# Patient Record
Sex: MALE | Race: WHITE | NOT HISPANIC OR LATINO | Employment: FULL TIME | URBAN - NONMETROPOLITAN AREA
[De-identification: names, ages, dates, MRNs, and addresses within clinical notes are randomized per-mention and may not be internally consistent; named-entity substitution may affect disease eponyms.]

---

## 2017-01-20 ENCOUNTER — APPOINTMENT (OUTPATIENT)
Dept: GENERAL RADIOLOGY | Facility: HOSPITAL | Age: 62
End: 2017-01-20

## 2017-01-20 ENCOUNTER — HOSPITAL ENCOUNTER (INPATIENT)
Facility: HOSPITAL | Age: 62
LOS: 4 days | Discharge: HOME | End: 2017-01-24
Attending: EMERGENCY MEDICINE | Admitting: INTERNAL MEDICINE

## 2017-01-20 DIAGNOSIS — I21.4 NON-STEMI (NON-ST ELEVATED MYOCARDIAL INFARCTION) (HCC): Primary | ICD-10-CM

## 2017-01-20 PROBLEM — R07.9 CHEST PAIN OF UNKNOWN ETIOLOGY: Status: ACTIVE | Noted: 2017-01-20

## 2017-01-20 LAB
ALBUMIN SERPL-MCNC: 4 G/DL (ref 3.4–4.8)
ALBUMIN/GLOB SERPL: 1.2 G/DL (ref 1.5–2.5)
ALP SERPL-CCNC: 64 U/L (ref 46–116)
ALT SERPL W P-5'-P-CCNC: 27 U/L (ref 10–44)
ANION GAP SERPL CALCULATED.3IONS-SCNC: 6.8 MMOL/L (ref 3.6–11.2)
APTT PPP: 24.4 SECONDS (ref 24.4–31)
APTT PPP: 25 SECONDS (ref 24.4–31)
AST SERPL-CCNC: 61 U/L (ref 10–34)
BASOPHILS # BLD AUTO: 0.02 10*3/MM3 (ref 0–0.3)
BASOPHILS NFR BLD AUTO: 0.3 % (ref 0–2)
BILIRUB SERPL-MCNC: 0.4 MG/DL (ref 0.2–1.8)
BILIRUB UR QL STRIP: NEGATIVE
BNP SERPL-MCNC: 157 PG/ML (ref 0–100)
BUN BLD-MCNC: 12 MG/DL (ref 7–21)
BUN/CREAT SERPL: 12.4 (ref 7–25)
CALCIUM SPEC-SCNC: 9.3 MG/DL (ref 7.7–10)
CHLORIDE SERPL-SCNC: 108 MMOL/L (ref 99–112)
CHOLEST SERPL-MCNC: 198 MG/DL (ref 0–200)
CK MB SERPL-CCNC: 61.03 NG/ML (ref 0–5)
CK MB SERPL-RTO: 9.2 % (ref 0–3)
CK SERPL-CCNC: 665 U/L (ref 24–204)
CLARITY UR: CLEAR
CO2 SERPL-SCNC: 30.2 MMOL/L (ref 24.3–31.9)
COLOR UR: YELLOW
CREAT BLD-MCNC: 0.97 MG/DL (ref 0.43–1.29)
DEPRECATED RDW RBC AUTO: 48.7 FL (ref 37–54)
DEPRECATED RDW RBC AUTO: 48.9 FL (ref 37–54)
EOSINOPHIL # BLD AUTO: 0.34 10*3/MM3 (ref 0–0.7)
EOSINOPHIL NFR BLD AUTO: 5.5 % (ref 0–5)
ERYTHROCYTE [DISTWIDTH] IN BLOOD BY AUTOMATED COUNT: 14.1 % (ref 11.5–14.5)
ERYTHROCYTE [DISTWIDTH] IN BLOOD BY AUTOMATED COUNT: 14.2 % (ref 11.5–14.5)
GFR SERPL CREATININE-BSD FRML MDRD: 78 ML/MIN/1.73
GLOBULIN UR ELPH-MCNC: 3.3 GM/DL
GLUCOSE BLD-MCNC: 87 MG/DL (ref 70–110)
GLUCOSE UR STRIP-MCNC: NEGATIVE MG/DL
HCT VFR BLD AUTO: 43.4 % (ref 42–52)
HCT VFR BLD AUTO: 44 % (ref 42–52)
HDLC SERPL-MCNC: 35 MG/DL (ref 60–100)
HGB BLD-MCNC: 14 G/DL (ref 14–18)
HGB BLD-MCNC: 14.1 G/DL (ref 14–18)
HGB UR QL STRIP.AUTO: NEGATIVE
IMM GRANULOCYTES # BLD: 0.01 10*3/MM3 (ref 0–0.03)
IMM GRANULOCYTES NFR BLD: 0.2 % (ref 0–0.5)
INR PPP: 0.93 (ref 0.8–1.1)
INR PPP: 0.94 (ref 0.8–1.1)
KETONES UR QL STRIP: NEGATIVE
LDLC SERPL CALC-MCNC: 123 MG/DL (ref 0–100)
LDLC/HDLC SERPL: 3.5 {RATIO}
LEUKOCYTE ESTERASE UR QL STRIP.AUTO: NEGATIVE
LIPASE SERPL-CCNC: 39 U/L (ref 13–60)
LYMPHOCYTES # BLD AUTO: 2.15 10*3/MM3 (ref 1–3)
LYMPHOCYTES NFR BLD AUTO: 34.6 % (ref 21–51)
MCH RBC QN AUTO: 31.1 PG (ref 27–33)
MCH RBC QN AUTO: 31.8 PG (ref 27–33)
MCHC RBC AUTO-ENTMCNC: 31.8 G/DL (ref 33–37)
MCHC RBC AUTO-ENTMCNC: 32.5 G/DL (ref 33–37)
MCV RBC AUTO: 97.7 FL (ref 80–94)
MCV RBC AUTO: 97.8 FL (ref 80–94)
MONOCYTES # BLD AUTO: 0.61 10*3/MM3 (ref 0.1–0.9)
MONOCYTES NFR BLD AUTO: 9.8 % (ref 0–10)
NEUTROPHILS # BLD AUTO: 3.08 10*3/MM3 (ref 1.4–6.5)
NEUTROPHILS NFR BLD AUTO: 49.6 % (ref 30–70)
NITRITE UR QL STRIP: NEGATIVE
OSMOLALITY SERPL CALC.SUM OF ELEC: 287.8 MOSM/KG (ref 273–305)
PH UR STRIP.AUTO: 5.5 [PH] (ref 5–8)
PLATELET # BLD AUTO: 184 10*3/MM3 (ref 130–400)
PLATELET # BLD AUTO: 185 10*3/MM3 (ref 130–400)
PMV BLD AUTO: 10.6 FL (ref 6–10)
PMV BLD AUTO: 10.9 FL (ref 6–10)
POTASSIUM BLD-SCNC: 4 MMOL/L (ref 3.5–5.3)
PROT SERPL-MCNC: 7.3 G/DL (ref 6–8)
PROT UR QL STRIP: NEGATIVE
PROTHROMBIN TIME: 10.5 SECONDS (ref 9.8–11.9)
PROTHROMBIN TIME: 10.6 SECONDS (ref 9.8–11.9)
RBC # BLD AUTO: 4.44 10*6/MM3 (ref 4.7–6.1)
RBC # BLD AUTO: 4.5 10*6/MM3 (ref 4.7–6.1)
SODIUM BLD-SCNC: 145 MMOL/L (ref 135–153)
SP GR UR STRIP: >1.03 (ref 1–1.03)
TRIGL SERPL-MCNC: 202 MG/DL (ref 0–150)
TROPONIN I SERPL-MCNC: 5.64 NG/ML
TROPONIN I SERPL-MCNC: 7.73 NG/ML
UROBILINOGEN UR QL STRIP: ABNORMAL
VLDLC SERPL-MCNC: 40.4 MG/DL
WBC NRBC COR # BLD: 6.21 10*3/MM3 (ref 4.5–12.5)
WBC NRBC COR # BLD: 8.08 10*3/MM3 (ref 4.5–12.5)

## 2017-01-20 PROCEDURE — 25010000002 EPTIFIBATIDE 20 MG/10ML SOLUTION: Performed by: INTERNAL MEDICINE

## 2017-01-20 PROCEDURE — 85730 THROMBOPLASTIN TIME PARTIAL: CPT | Performed by: INTERNAL MEDICINE

## 2017-01-20 PROCEDURE — 99223 1ST HOSP IP/OBS HIGH 75: CPT | Performed by: INTERNAL MEDICINE

## 2017-01-20 PROCEDURE — 99285 EMERGENCY DEPT VISIT HI MDM: CPT

## 2017-01-20 PROCEDURE — 85730 THROMBOPLASTIN TIME PARTIAL: CPT | Performed by: EMERGENCY MEDICINE

## 2017-01-20 PROCEDURE — 84484 ASSAY OF TROPONIN QUANT: CPT | Performed by: INTERNAL MEDICINE

## 2017-01-20 PROCEDURE — 93005 ELECTROCARDIOGRAM TRACING: CPT | Performed by: EMERGENCY MEDICINE

## 2017-01-20 PROCEDURE — 71010 XR CHEST 1 VW: CPT | Performed by: RADIOLOGY

## 2017-01-20 PROCEDURE — 85025 COMPLETE CBC W/AUTO DIFF WBC: CPT | Performed by: EMERGENCY MEDICINE

## 2017-01-20 PROCEDURE — 80061 LIPID PANEL: CPT | Performed by: EMERGENCY MEDICINE

## 2017-01-20 PROCEDURE — 25010000002 EPTIFIBATIDE PER 5 MG: Performed by: INTERNAL MEDICINE

## 2017-01-20 PROCEDURE — 82550 ASSAY OF CK (CPK): CPT | Performed by: INTERNAL MEDICINE

## 2017-01-20 PROCEDURE — 83880 ASSAY OF NATRIURETIC PEPTIDE: CPT | Performed by: EMERGENCY MEDICINE

## 2017-01-20 PROCEDURE — 36415 COLL VENOUS BLD VENIPUNCTURE: CPT

## 2017-01-20 PROCEDURE — 85027 COMPLETE CBC AUTOMATED: CPT | Performed by: INTERNAL MEDICINE

## 2017-01-20 PROCEDURE — 80053 COMPREHEN METABOLIC PANEL: CPT | Performed by: EMERGENCY MEDICINE

## 2017-01-20 PROCEDURE — 25010000002 HEPARIN (PORCINE) PER 1000 UNITS: Performed by: INTERNAL MEDICINE

## 2017-01-20 PROCEDURE — 99284 EMERGENCY DEPT VISIT MOD MDM: CPT

## 2017-01-20 PROCEDURE — 81003 URINALYSIS AUTO W/O SCOPE: CPT | Performed by: EMERGENCY MEDICINE

## 2017-01-20 PROCEDURE — 84484 ASSAY OF TROPONIN QUANT: CPT | Performed by: EMERGENCY MEDICINE

## 2017-01-20 PROCEDURE — 82553 CREATINE MB FRACTION: CPT | Performed by: INTERNAL MEDICINE

## 2017-01-20 PROCEDURE — 71010 HC CHEST PA OR AP: CPT

## 2017-01-20 PROCEDURE — 93010 ELECTROCARDIOGRAM REPORT: CPT | Performed by: INTERNAL MEDICINE

## 2017-01-20 PROCEDURE — 85610 PROTHROMBIN TIME: CPT | Performed by: EMERGENCY MEDICINE

## 2017-01-20 PROCEDURE — 83690 ASSAY OF LIPASE: CPT | Performed by: EMERGENCY MEDICINE

## 2017-01-20 PROCEDURE — 85610 PROTHROMBIN TIME: CPT | Performed by: INTERNAL MEDICINE

## 2017-01-20 RX ORDER — GABAPENTIN 300 MG/1
300 CAPSULE ORAL EVERY 12 HOURS SCHEDULED
Status: DISCONTINUED | OUTPATIENT
Start: 2017-01-20 | End: 2017-01-24 | Stop reason: HOSPADM

## 2017-01-20 RX ORDER — ASPIRIN 325 MG
325 TABLET, DELAYED RELEASE (ENTERIC COATED) ORAL DAILY
Status: DISCONTINUED | OUTPATIENT
Start: 2017-01-21 | End: 2017-01-24 | Stop reason: HOSPADM

## 2017-01-20 RX ORDER — HEPARIN SODIUM 5000 [USP'U]/ML
56.5 INJECTION, SOLUTION INTRAVENOUS; SUBCUTANEOUS ONCE
Status: COMPLETED | OUTPATIENT
Start: 2017-01-20 | End: 2017-01-20

## 2017-01-20 RX ORDER — ASPIRIN 325 MG
325 TABLET, DELAYED RELEASE (ENTERIC COATED) ORAL DAILY
COMMUNITY
End: 2017-02-22 | Stop reason: SDUPTHER

## 2017-01-20 RX ORDER — GABAPENTIN 300 MG/1
300 CAPSULE ORAL 2 TIMES DAILY
COMMUNITY

## 2017-01-20 RX ORDER — HEPARIN SODIUM 5000 [USP'U]/ML
56.5 INJECTION, SOLUTION INTRAVENOUS; SUBCUTANEOUS AS NEEDED
Status: DISCONTINUED | OUTPATIENT
Start: 2017-01-20 | End: 2017-01-23

## 2017-01-20 RX ORDER — MELOXICAM 15 MG/1
15 TABLET ORAL DAILY PRN
COMMUNITY
End: 2018-03-21

## 2017-01-20 RX ORDER — HEPARIN SODIUM 5000 [USP'U]/ML
INJECTION, SOLUTION INTRAVENOUS; SUBCUTANEOUS
Status: DISPENSED
Start: 2017-01-20 | End: 2017-01-21

## 2017-01-20 RX ORDER — EPTIFIBATIDE 0.75 MG/ML
2 INJECTION, SOLUTION INTRAVENOUS CONTINUOUS
Status: DISPENSED | OUTPATIENT
Start: 2017-01-20 | End: 2017-01-21

## 2017-01-20 RX ORDER — PANTOPRAZOLE SODIUM 40 MG/1
40 TABLET, DELAYED RELEASE ORAL DAILY
Status: DISCONTINUED | OUTPATIENT
Start: 2017-01-21 | End: 2017-01-24 | Stop reason: HOSPADM

## 2017-01-20 RX ORDER — MELOXICAM 7.5 MG/1
15 TABLET ORAL DAILY PRN
Status: DISCONTINUED | OUTPATIENT
Start: 2017-01-20 | End: 2017-01-20

## 2017-01-20 RX ORDER — CLOPIDOGREL BISULFATE 75 MG/1
TABLET ORAL
Status: COMPLETED
Start: 2017-01-20 | End: 2017-01-20

## 2017-01-20 RX ORDER — CLOPIDOGREL BISULFATE 75 MG/1
600 TABLET ORAL ONCE
Status: COMPLETED | OUTPATIENT
Start: 2017-01-20 | End: 2017-01-20

## 2017-01-20 RX ORDER — HEPARIN SODIUM 5000 [USP'U]/ML
28.2 INJECTION, SOLUTION INTRAVENOUS; SUBCUTANEOUS AS NEEDED
Status: DISCONTINUED | OUTPATIENT
Start: 2017-01-20 | End: 2017-01-23

## 2017-01-20 RX ORDER — ASPIRIN 81 MG/1
TABLET, CHEWABLE ORAL
Status: COMPLETED
Start: 2017-01-20 | End: 2017-01-20

## 2017-01-20 RX ORDER — CARVEDILOL 6.25 MG/1
12.5 TABLET ORAL 2 TIMES DAILY WITH MEALS
Status: DISCONTINUED | OUTPATIENT
Start: 2017-01-20 | End: 2017-01-23

## 2017-01-20 RX ORDER — ASPIRIN 81 MG/1
324 TABLET, CHEWABLE ORAL ONCE
Status: COMPLETED | OUTPATIENT
Start: 2017-01-20 | End: 2017-01-20

## 2017-01-20 RX ORDER — EPTIFIBATIDE 20 MG/10ML
180 INJECTION INTRAVENOUS ONCE
Status: COMPLETED | OUTPATIENT
Start: 2017-01-20 | End: 2017-01-20

## 2017-01-20 RX ORDER — CETIRIZINE HYDROCHLORIDE 10 MG/1
10 TABLET ORAL DAILY
Status: DISCONTINUED | OUTPATIENT
Start: 2017-01-21 | End: 2017-01-24 | Stop reason: HOSPADM

## 2017-01-20 RX ORDER — PANTOPRAZOLE SODIUM 40 MG/1
40 TABLET, DELAYED RELEASE ORAL DAILY
COMMUNITY

## 2017-01-20 RX ORDER — CARVEDILOL 12.5 MG/1
12.5 TABLET ORAL 2 TIMES DAILY WITH MEALS
COMMUNITY
End: 2017-01-24 | Stop reason: HOSPADM

## 2017-01-20 RX ORDER — CETIRIZINE HYDROCHLORIDE 10 MG/1
10 TABLET ORAL DAILY
COMMUNITY

## 2017-01-20 RX ADMIN — EPTIFIBATIDE 15940 MCG: 2 INJECTION, SOLUTION INTRAVENOUS at 22:32

## 2017-01-20 RX ADMIN — CLOPIDOGREL BISULFATE 600 MG: 75 TABLET ORAL at 20:08

## 2017-01-20 RX ADMIN — EPTIFIBATIDE 2 MCG/KG/MIN: 0.75 INJECTION, SOLUTION INTRAVENOUS at 22:15

## 2017-01-20 RX ADMIN — ASPIRIN 324 MG: 81 TABLET, CHEWABLE ORAL at 20:08

## 2017-01-20 RX ADMIN — GABAPENTIN 300 MG: 300 CAPSULE ORAL at 22:14

## 2017-01-20 RX ADMIN — HEPARIN SODIUM 1000 UNITS/HR: 10000 INJECTION, SOLUTION INTRAVENOUS at 20:13

## 2017-01-20 RX ADMIN — CLOPIDOGREL BISULFATE 600 MG: 75 TABLET, FILM COATED ORAL at 20:08

## 2017-01-20 RX ADMIN — HEPARIN SODIUM 5000 UNITS: 5000 INJECTION, SOLUTION INTRAVENOUS; SUBCUTANEOUS at 20:12

## 2017-01-20 NOTE — Clinical Note
50 ml of contrast added to back table for intervention  500 ml of heparin flush 2 units/ml added to back table for intervention  30 ml of nitroglycerin added to back table 100 mcg/ml

## 2017-01-20 NOTE — ED PROVIDER NOTES
Subjective   Patient is a 61 y.o. male presenting with chest pain.   History provided by:  Patient  Chest Pain   Pain location:  Substernal area  Pain quality: tightness    Pain radiates to:  L shoulder and R shoulder  Pain severity:  Moderate  Onset quality:  Sudden  Timing:  Intermittent  Progression:  Resolved  Chronicity:  New  Relieved by:  Rest  Worsened by:  Exertion  Associated symptoms: abdominal pain    Associated symptoms: no fever, no nausea, no shortness of breath and no vomiting        Review of Systems   Constitutional: Negative for activity change and fever.   HENT: Negative.    Eyes: Negative for discharge.   Respiratory: Negative for shortness of breath.    Cardiovascular: Positive for chest pain.   Gastrointestinal: Positive for abdominal pain. Negative for diarrhea, nausea and vomiting.   Genitourinary: Negative for difficulty urinating and flank pain.   Musculoskeletal: Negative for arthralgias and myalgias.   Skin: Negative for rash and wound.   Psychiatric/Behavioral: Negative for agitation.   All other systems reviewed and are negative.      History reviewed. No pertinent past medical history.    No Known Allergies    History reviewed. No pertinent past surgical history.    No family history on file.    Social History     Social History   • Marital status:      Spouse name: N/A   • Number of children: N/A   • Years of education: N/A     Social History Main Topics   • Smoking status: None   • Smokeless tobacco: None   • Alcohol use None   • Drug use: None   • Sexual activity: Not Asked     Other Topics Concern   • None     Social History Narrative   • None           Objective   Physical Exam   Constitutional: He is oriented to person, place, and time. He appears well-developed and well-nourished.   HENT:   Head: Normocephalic.   Right Ear: External ear normal.   Left Ear: External ear normal.   Nose: Nose normal.   Mouth/Throat: Oropharynx is clear and moist.   Eyes: EOM are normal.  Pupils are equal, round, and reactive to light.   Neck: Normal range of motion. Neck supple. No thyromegaly present.   Cardiovascular: Normal rate, regular rhythm, normal heart sounds and intact distal pulses.    Pulmonary/Chest: Effort normal and breath sounds normal. No respiratory distress. He has no wheezes. He has no rales.   Abdominal: Soft. He exhibits no distension. There is no tenderness. There is no rebound and no guarding.   Musculoskeletal: Normal range of motion. He exhibits no edema or tenderness.   Neurological: He is alert and oriented to person, place, and time. He has normal reflexes. No cranial nerve deficit.   Skin: Skin is warm and dry. No rash noted.   Psychiatric: He has a normal mood and affect. His behavior is normal. Judgment and thought content normal.   Nursing note and vitals reviewed.      Procedures         ED Course  ED Course   Value Comment By Time   SCANNED EKG (Reviewed) Brett Santos MD 01/20 1813   SCANNED EKG (Reviewed) Brett Santos MD 01/20 1813    Admit patient to Dr. Rebecca Santos MD 01/20 1911    Patient's troponin came back elevated at 5.6.  Dr. Fernandez was notified we started the patient on heparin drip and Plavix.  We'll send the patient to the unit Brett Santos MD 01/20 1958                  MDM  Number of Diagnoses or Management Options  Non-STEMI (non-ST elevated myocardial infarction): new and requires workup     Amount and/or Complexity of Data Reviewed  Clinical lab tests: reviewed and ordered  Tests in the radiology section of CPT®: ordered and reviewed  Tests in the medicine section of CPT®: ordered and reviewed  Discuss the patient with other providers: yes    Risk of Complications, Morbidity, and/or Mortality  Presenting problems: moderate  Diagnostic procedures: moderate  Management options: moderate    Patient Progress  Patient progress: stable      Final diagnoses:   Non-STEMI (non-ST elevated myocardial infarction)            Brett  MD Tom  01/20/17 1929       Brett Santos MD  01/20/17 2000

## 2017-01-20 NOTE — IP AVS SNAPSHOT
AFTER VISIT SUMMARY             Keith Craig           About your hospitalization     You were admitted on:  January 20, 2017 You last received care in the:  [facility]       Procedures & Surgeries      Procedure(s) (LRB):  Left Heart Cath (N/A)  Percutaneous Coronary Intervention (N/A)     1/20/2017 - 1/23/2017     Surgeon(s):  Larry Fernandez MD  -------------------      Medications    If you or your caregiver advised us that you are currently taking a medication and that medication is marked below as “Resume”, this simply indicates that we have reviewed those medications to make sure our new therapy recommendations do not interfere.  If you have concerns about medications other than those new ones which we are prescribing today, please consult the physician who prescribed them (or your primary physician).  Our review of your home medications is not meant to indicate that we are directing their use.             Your Medications      START taking these medications     atorvastatin 40 MG tablet   Take 1 tablet by mouth Daily.   Last time this was given:  1/24/2017  8:22 AM   Commonly known as:  LIPITOR           clopidogrel 75 MG tablet   Take 1 tablet by mouth Daily.   Last time this was given:  1/24/2017  8:21 AM   Commonly known as:  PLAVIX           clopidogrel 75 MG tablet   Take 1 tablet by mouth Daily.   Last time this was given:  1/24/2017  8:21 AM   Commonly known as:  PLAVIX           lisinopril 2.5 MG tablet   Take 1 tablet by mouth Daily.   Commonly known as:  PRINIVIL,ZESTRIL           lisinopril 2.5 MG tablet   Take 1 tablet by mouth Daily.   Commonly known as:  PRINIVIL,ZESTRIL             CHANGE how you take these medications     aspirin  MG tablet   Take 1 tablet by mouth Daily.   Last time this was given:  1/24/2017  8:22 AM   What changed:  You were already taking a medication with the same name, and this prescription was added. Make sure you understand how  and when to take each.           aspirin  MG tablet   Take 325 mg by mouth Daily.   Last time this was given:  1/24/2017  8:22 AM   What changed:  Another medication with the same name was added. Make sure you understand how and when to take each.           carvedilol 3.125 MG tablet   Take 1 tablet by mouth 2 (Two) Times a Day With Meals.   Last time this was given:  1/24/2017  8:21 AM   Commonly known as:  COREG   What changed:    - medication strength  - how much to take           carvedilol 3.125 MG tablet   Take 1 tablet by mouth 2 Times a Day.   Last time this was given:  1/24/2017  8:21 AM   Commonly known as:  COREG   What changed:  You were already taking a medication with the same name, and this prescription was added. Make sure you understand how and when to take each.             CONTINUE taking these medications     cetirizine 10 MG tablet   Take 10 mg by mouth Daily.   Last time this was given:  1/24/2017  8:21 AM   Commonly known as:  zyrTEC           gabapentin 300 MG capsule   Take 300 mg by mouth 2 (Two) Times a Day.   Last time this was given:  1/24/2017  8:21 AM   Commonly known as:  NEURONTIN           meloxicam 15 MG tablet   Take 15 mg by mouth Daily As Needed.   Commonly known as:  MOBIC           pantoprazole 40 MG EC tablet   Take 40 mg by mouth Daily.   Last time this was given:  1/24/2017  8:21 AM   Commonly known as:  PROTONIX                Where to Get Your Medications      These medications were sent to [facility]  [address], [place] [address]    Hours:  8AM-6PM Mon-Fri Phone:  [phone]     aspirin  MG tablet    atorvastatin 40 MG tablet    carvedilol 3.125 MG tablet    clopidogrel 75 MG tablet    lisinopril 2.5 MG tablet         Information about where to get these medications is not yet available     ! Ask your nurse or doctor about these medications     carvedilol 3.125 MG tablet    clopidogrel 75 MG tablet    lisinopril 2.5 MG tablet                   Your Medications      Your Medication List           Morning Noon Evening Bedtime As Needed    * aspirin  MG tablet   Take 1 tablet by mouth Daily.                                * aspirin  MG tablet   Take 325 mg by mouth Daily.                                atorvastatin 40 MG tablet   Take 1 tablet by mouth Daily.   Commonly known as:  LIPITOR                                * carvedilol 3.125 MG tablet   Take 1 tablet by mouth 2 (Two) Times a Day With Meals.   Commonly known as:  COREG                                * carvedilol 3.125 MG tablet   Take 1 tablet by mouth 2 Times a Day.   Commonly known as:  COREG                                cetirizine 10 MG tablet   Take 10 mg by mouth Daily.   Commonly known as:  zyrTEC                                * clopidogrel 75 MG tablet   Take 1 tablet by mouth Daily.   Commonly known as:  PLAVIX                                * clopidogrel 75 MG tablet   Take 1 tablet by mouth Daily.   Commonly known as:  PLAVIX                                gabapentin 300 MG capsule   Take 300 mg by mouth 2 (Two) Times a Day.   Commonly known as:  NEURONTIN                                * lisinopril 2.5 MG tablet   Take 1 tablet by mouth Daily.   Commonly known as:  PRINIVIL,ZESTRIL                                * lisinopril 2.5 MG tablet   Take 1 tablet by mouth Daily.   Commonly known as:  PRINIVIL,ZESTRIL                                meloxicam 15 MG tablet   Take 15 mg by mouth Daily As Needed.   Commonly known as:  MOBIC                                pantoprazole 40 MG EC tablet   Take 40 mg by mouth Daily.   Commonly known as:  PROTONIX                                * Notice:  This list has 8 medication(s) that are the same as other medications prescribed for you. Read the directions carefully, and ask your doctor or other care provider to review them with you.             Instructions for After Discharge        Discharge References/Attachments      CARDIAC REHABILITATION (ENGLISH)    CORONARY ANGIOGRAM WITH STENT, CARE AFTER  (ENGLISH)       Follow-ups for After Discharge        Scheduled Appointments     Feb 08, 2017  9:00 AM EST   Follow Up with Larry Fernandez MD   [facility] (--)    [address]  [place] [address] [phone]           Arrive 15 minutes prior to appointment.            Feb 16, 2017  4:15 PM EST   New Patient with Mark Oneill MD   [facility] (--)    [address]  [place] [id number] [phone]           Bring all previous medical records and films, along with current medications and insurance information.            Additional instructions:      Dr. Larry Fernandez on Wednesday, February 8, 2017 @ 09:00 am  [address] [facility]  [address]  [place] [address]  [phone]              CommitChange Signup     Our records indicate that you have an active [employer] account.    You can view your After Visit Summary by going to [employer] and logging in with your CommitChange username and password.  If you don't have a CommitChange username and password but a parent or guardian has access to your record, the parent or guardian should login with their own CommitChange username and password and access your record to view the After Visit Summary.    If you have questions, you can email [email or web address] or call [phone] to talk to our CommitChange staff.  Remember, CommitChange is NOT to be used for urgent needs.  For medical emergencies, dial 911.           Summary of Your Hospitalization        Reason for Hospitalization     Your primary diagnosis was:  Not on File    Your diagnoses also included:  Chest Pain Of Unknown Etiology, Heart Attack      Care Providers     Provider Service Role Specialty    Larry Fernandez MD Cardiology Attending Provider Cardiology    Larry Fernandez MD Cardiology Consulting Physician  Cardiology      Your  "Allergies  Date Reviewed: 1/20/2017    No active allergies      Patient Belongings Returned     Document Return of Belongings Flowsheet     Were the patient bedside belongings sent home?   Yes   Belongings Retrieved from Security & Sent Home   N/A    Belongings Sent to Safe   --   Medications Retrieved from Pharmacy & Sent Home   N/A              More Information      Cardiac Rehabilitation  Cardiac rehabilitation is a medically supervised program that helps improve the health and well-being of people with heart problems. Cardiac rehabilitation includes exercise training, education, and counseling to help you get stronger and return to an active lifestyle. People who participate in cardiac rehabilitation programs get better faster and reduce future hospital stays.    Cardiac Rehab [facility]   Phone number [phone]  [address] [id number]    Cardiac rehabilitation programs can help when you have had the following conditions:  · Heart attack.  · Heart failure.  · Peripheral artery disease.  · Coronary artery disease.  · Angina.  · Lung or breathing problems.  Cardiac rehabilitation programs are also used when you have the following procedures:  · Coronary artery bypass graft surgery.  · Heart valve replacement.  · Heart stent placement.  · Heart transplant.  · Aneurysm repair.  CARDIAC REHABILITATION MAY HELP YOU:  · Reduce problems like chest pain and trouble breathing.  · Change risk factors that contribute to heart disease, such as:    Smoking.    High blood pressure.    High cholesterol.    Diabetes.    Being out of shape or not active.    Weighing more than 30% over your ideal weight.    Diet.  · Improve your mental outlook so you feel:    Less depressed or \"blue.\"    More hopeful.    Better about yourself.    More confident about taking care of yourself.  · Get support from health experts as well as other people with similar problems.  · Learn how to manage and understand your " medicines.  · Teach your family about your condition and how to participate in your recovery.  WHAT HAPPENS IN CARDIAC REHABILITATION?  You will be assessed by a cardiac rehabilitation team. They will check your health history and do a physical exam. You may need blood tests, stress tests, and other evaluations. You may not start a cardiac rehabilitation program if:  · You develop angina with exercise or while at rest.  · You have severe heart failure that limits your activity.  · You have an abnormal heart rhythm at rest.  · You develop heart rhythm problems during exercise.  · You have high blood pressure that is not controlled.  The cardiac rehabilitation team works with you to make a plan based on your health and goals. Everyone is unique, so each program is customized and your program may change as you progress. Members of a typical cardiac rehabilitation team may include such health professionals as:  · Doctors.  · Nurses.  · Dietitians.  · Psychologists.  · Exercise specialists.  · Physical and occupational therapists.  A typical cardiac rehabilitation program is divided into phases. You advance from one phase to the next. Most cardiac rehabilitation sessions last for 60 minutes, 3 times a week.   Phase One starts while you are still in the hospital. You may start by walking in your room and then in the robledo. You may start some simple exercises with a therapist. Health care team members will give you information and ask you many questions. You may not be able to remember details, so have a family member or an advocate with you to help keep track of information.   Phase Two begins when you go home or to another facility. This phase may last 8 to 12 weeks. You will travel to a cardiac rehabilitation center or a place where it is offered. Typically, you gradually increase your activity while being closely watched by a nurse or therapist. Exercises may be a combination of strength or resistance training and  "\"cardio\" or aerobic movement on a treadmill or other machines. Your condition will determine how often and how long these sessions will last.   In phase two, you may learn how to cook healthy meals, control your blood sugar, and manage your medicines. You may need help with scheduling or planning how and when to take your medicines. Use a timer, divided pill box, or follow a form to make taking your medicines easier. Use the method that works best for you. Some medicines should not be taken with certain foods. If you take more than one blood pressure medicine, you may need to stagger the times you take them. Taking all your blood pressure medicine at the same time may lower your blood pressure too much. If you have questions about your medicines, ask your health care provider questions until you understand.   Phase Three continues for the rest of your life. There will be less supervision. You may still participate in cardiac rehabilitation activities or become part of a group in your community. You may benefit from talking to other people about your experience if they are facing similar challenges.  How soon you drive, have sex, or return to work will depend on your condition. These decisions should be made by you and your health care provider. If you need help, ask for it. Find out where you can get the help you need. Ask questions until you get answers and understand.  SEEK IMMEDIATE MEDICAL CARE IF:   Get medical help at once if you experience any of the following symptoms:  · Severe chest discomfort, especially if the pain is crushing or pressure-like and spreads to the arms, back, neck, or jaw. Do not wait to see if the pain will go away.  · Weakness or numbness in your face, arms, or legs, especially on one side of the body; slurred speech; confusion; sudden severe headache or loss of vision (all symptoms of stroke).  · You have shortness of breath.  · You are sweating and feel sick to your stomach " (nausea).  · You feel dizzy or faint.  · You experience profound tiredness (fatigue).  Call your local emergency service (911 in the [place] Do not drive yourself to the hospital.     This information is not intended to replace advice given to you by your health care provider. Make sure you discuss any questions you have with your health care provider.     Document Released: 09/26/2009 Document Revised: 01/08/2016 Document Reviewed: 03/23/2012  [employer] Interactive Patient Education ©2016 [employer]          Coronary Angiogram With Stent, Care After  Refer to this sheet in the next few weeks. These instructions provide you with information about caring for yourself after your procedure. Your health care provider may also give you more specific instructions. Your treatment has been planned according to current medical practices, but problems sometimes occur. Call your health care provider if you have any problems or questions after your procedure.  WHAT TO EXPECT AFTER THE PROCEDURE   After your procedure, it is typical to have the following:  · Bruising at the catheter insertion site that usually fades within 1-2 weeks.  · Blood collecting in the tissue (hematoma) that may be painful to the touch. It should usually decrease in size and tenderness within 1-2 weeks.  HOME CARE INSTRUCTIONS  · Take medicines only as directed by your health care provider. Blood thinners may be prescribed after your procedure to improve blood flow through the stent.  · You may shower 24-48 hours after the procedure or as directed by your health care provider. Remove the bandage (dressing) and gently wash the catheter insertion site with plain soap and water. Pat the area dry with a clean towel. Do not rub the site, because this may cause bleeding.  · Do not take baths, swim, or use a hot tub until your health care provider approves.  · Check your catheter insertion site every day for redness, swelling, or drainage.  · Do not apply  powder or lotion to the site.  · Do not lift over 10 lb (4.5 kg) for 5 days after your procedure or as directed by your health care provider.  · Ask your health care provider when it is okay to:    Return to work or school.    Resume usual physical activities or sports.    Resume sexual activity.  · Eat a heart-healthy diet. This should include plenty of fresh fruits and vegetables. Meat should be lean cuts. Avoid the following types of food:    Food that is high in salt.    Canned or highly processed food.    Food that is high in saturated fat or sugar.    Fried food.  · Make any other lifestyle changes as recommended by your health care provider. These may include:    Not using any tobacco products, including cigarettes, chewing tobacco, or electronic cigarettes. If you need help quitting, ask your health care provider.    Managing your weight.    Getting regular exercise.    Managing your blood pressure.    Limiting your alcohol intake.    Managing other health problems, such as diabetes.  · If you need an MRI after your heart stent has been placed, be sure to tell the health care provider who orders the MRI that you have a heart stent.  · Keep all follow-up visits as directed by your health care provider. This is important.  SEEK MEDICAL CARE IF:  · You have a fever.  · You have chills.  · You have increased bleeding from the catheter insertion site. Hold pressure on the site.  SEEK IMMEDIATE MEDICAL CARE IF:  · You develop chest pain or shortness of breath, feel faint, or pass out.  · You have unusual pain at the catheter insertion site.  · You have redness, warmth, or swelling at the catheter insertion site.  · You have drainage (other than a small amount of blood on the dressing) from the catheter insertion site.  · The catheter insertion site is bleeding, and the bleeding does not stop after 30 minutes of holding steady pressure on the site.  · You develop bleeding from any other place, such as from your  rectum. There may be bright red blood in your urine or stool, or it may appear as black, tarry stool.     This information is not intended to replace advice given to you by your health care provider. Make sure you discuss any questions you have with your health care provider.     Document Released: 07/07/2006 Document Revised: 01/08/2016 Document Reviewed: 05/12/2014  [employer] Interactive Patient Education ©2016 Elsevier Inc.         PREVENTING SURGICAL SITE INFECTIONS     Surgical Site Infections FAQs  What is a Surgical Site Infection (SSI)?  A surgical site infection is an infection that occurs after surgery in the part of the body where the surgery took place. Most patients who have surgery do not develop an infection. However, infections develop in about 1 to 3 out of every 100 patients who have surgery.  Some of the common symptoms of a surgical site infection are:  · Redness and pain around the area where you had surgery  · Drainage of cloudy fluid from your surgical wound  · Fever  Can SSIs be treated?  Yes. Most surgical site infections can be treated with antibiotics. The antibiotic given to you depends on the bacteria (germs) causing the infection. Sometimes patients with SSIs also need another surgery to treat the infection.  What are some of the things that hospitals are doing to prevent SSIs?  To prevent SSIs, doctors, nurses, and other healthcare providers:  · Clean their hands and arms up to their elbows with an antiseptic agent just before the surgery.  · Clean their hands with soap and water or an alcohol-based hand rub before and after caring for each patient.  · May remove some of your hair immediately before your surgery using electric clippers if the hair is in the same area where the procedure will occur. They should not shave you with a razor.  · Wear special hair covers, masks, gowns, and gloves during surgery to keep the surgery area clean.  · Give you antibiotics before your surgery  starts. In most cases, you should get antibiotics within 60 minutes before the surgery starts and the antibiotics should be stopped within 24 hours after surgery.  · Clean the skin at the site of your surgery with a special soap that kills germs.  What can I do to help prevent SSIs?  Before your surgery:  · Tell your doctor about other medical problems you may have. Health problems such as allergies, diabetes, and obesity could affect your surgery and your treatment.  · Quit smoking. Patients who smoke get more infections. Talk to your doctor about how you can quit before your surgery.  · Do not shave near where you will have surgery. Shaving with a razor can irritate your skin and make it easier to develop an infection.  At the time of your surgery:  · Speak up if someone tries to shave you with a razor before surgery. Ask why you need to be shaved and talk with your surgeon if you have any concerns.  · Ask if you will get antibiotics before surgery.  After your surgery:  · Make sure that your healthcare providers clean their hands before examining you, either with soap and water or an alcohol-based hand rub.    If you do not see your providers clean their hands, please ask them to do so.  · Family and friends who visit you should not touch the surgical wound or dressings.  · Family and friends should clean their hands with soap and water or an alcohol-based hand rub before and after visiting you. If you do not see them clean their hands, ask them to clean their hands.  What do I need to do when I go home from the hospital?  · Before you go home, your doctor or nurse should explain everything you need to know about taking care of your wound. Make sure you understand how to care for your wound before you leave the hospital.  · Always clean your hands before and after caring for your wound.  · Before you go home, make sure you know who to contact if you have questions or problems after you get home.  · If you have  any symptoms of an infection, such as redness and pain at the surgery site, drainage, or fever, call your doctor immediately.  If you have additional questions, please ask your doctor or nurse.  Developed and co-sponsored by The Society for Healthcare Epidemiology of [place] (SHEA); Infectious Diseases Society of [place] (IDSA); American Hospital Association; Association for Professionals in Infection Control and Epidemiology (APIC); Centers for Disease Control and Prevention (CDC); and The Joint Commission.     This information is not intended to replace advice given to you by your health care provider. Make sure you discuss any questions you have with your health care provider.     Document Released: 12/23/2014 Document Revised: 01/08/2016 Document Reviewed: 03/02/2016  [employer] Interactive Patient Education ©2016 Elsevier Inc.             SYMPTOMS OF A STROKE    Call 911 or have someone take you to the Emergency Department if you have any of the following:    · Sudden numbness or weakness of your face, arm or leg especially on one side of the body  · Sudden confusion, diffiiculty speaking or trouble understanding   · Changes in your vision or loss of sight in one eye  · Sudden severe headache with no known cause  · sudden dizziness, trouble walking, loss of balance or coordination    It is important to seek emergency care right away if you have further stroke symptoms. If you get emergency help quickly, the powerful clot-dissolving medicines can reduce the disabilities caused by a stroke.     For more information:    American Stroke Association  5-894-1-STROKE  www.strokeassociation.org           IF YOU SMOKE OR USE TOBACCO PLEASE READ THE FOLLOWING:    Why is smoking bad for me?  Smoking increases the risk of heart disease, lung disease, vascular disease, stroke, and cancer.     If you smoke, STOP!    If you would like more information on quitting smoking, please visit the [employer] website:  www.[employer]/healthier-together/smoke   This link will provide additional resources including the QUIT line and the Beat the Pack support groups.     For more information:    American Cancer Society  ([phone] 507-4271    American Heart Association  1-853.447.2272               YOU ARE THE MOST IMPORTANT FACTOR IN YOUR RECOVERY.     Follow all instructions carefully.     I have reviewed my discharge instructions with my nurse, including the following information, if applicable:     Information about my illness and diagnosis   Follow up appointments (including lab draws)   Wound Care   Equipment Needs   Medications (new and continuing) along with side effects   Preventative information such as vaccines and smoking cessations   Diet   Pain   I know when to contact my Doctor's office or seek emergency care      I want my nurse to describe the side effects of my medications: YES NO   If the answer is no, I understand the side effects of my medications: YES NO   My nurse described the side effects of my medications in a way that I could understand: YES NO   I have taken my personal belongings and my own medications with me at discharge: YES NO            I have received this information and my questions have been answered. I have discussed any concerns I see with this plan with the nurse or physician. I understand these instructions.    Signature of Patient or Responsible Person: _____________________________________    Date: _________________  Time: __________________    Signature of Healthcare Provider: _______________________________________  Date: _________________  Time: __________________

## 2017-01-20 NOTE — Clinical Note
Level of Care: Critical Care [6]   Diagnosis: Non-STEMI (non-ST elevated myocardial infarction) [579824]   Admitting Physician: ISAK CAT [[id number]]   Attending Physician: ISAK CAT [[id number]]

## 2017-01-20 NOTE — Clinical Note
500 ml of heparin flush 2 units/ml added to back table  500 ml of heparin flush 2 units ml/ added to manifold

## 2017-01-20 NOTE — Clinical Note
Second inflation of balloon - Max pressure = 14 jaren. 2nd Inflation of balloon - Duration = 10 seconds.

## 2017-01-21 ENCOUNTER — APPOINTMENT (OUTPATIENT)
Dept: CARDIOLOGY | Facility: HOSPITAL | Age: 62
End: 2017-01-21
Attending: INTERNAL MEDICINE

## 2017-01-21 LAB
ANION GAP SERPL CALCULATED.3IONS-SCNC: 5 MMOL/L (ref 3.6–11.2)
APTT PPP: 42.6 SECONDS (ref 24.4–31)
APTT PPP: 64.9 SECONDS (ref 24.4–31)
APTT PPP: 66 SECONDS (ref 24.4–31)
BH CV ECHO MEAS - % IVS THICK: 5 %
BH CV ECHO MEAS - % LVPW THICK: 60 %
BH CV ECHO MEAS - ACS: 1.6 CM
BH CV ECHO MEAS - AO ROOT AREA (BSA CORRECTED): 1.3
BH CV ECHO MEAS - AO ROOT AREA: 5.6 CM^2
BH CV ECHO MEAS - AO ROOT DIAM: 2.7 CM
BH CV ECHO MEAS - BSA(HAYCOCK): 2.1 M^2
BH CV ECHO MEAS - BSA: 2.1 M^2
BH CV ECHO MEAS - BZI_BMI: 27.8 KILOGRAMS/M^2
BH CV ECHO MEAS - BZI_METRIC_HEIGHT: 180.3 CM
BH CV ECHO MEAS - BZI_METRIC_WEIGHT: 90.3 KG
BH CV ECHO MEAS - CONTRAST EF 4CH: 65.1 ML/M^2
BH CV ECHO MEAS - EDV(CUBED): 173.1 ML
BH CV ECHO MEAS - EDV(MOD-SP4): 83 ML
BH CV ECHO MEAS - EDV(TEICH): 152 ML
BH CV ECHO MEAS - EF(CUBED): 65.1 %
BH CV ECHO MEAS - EF(MOD-SP4): 65.1 %
BH CV ECHO MEAS - EF(TEICH): 56 %
BH CV ECHO MEAS - ESV(CUBED): 60.4 ML
BH CV ECHO MEAS - ESV(MOD-SP4): 29 ML
BH CV ECHO MEAS - ESV(TEICH): 66.8 ML
BH CV ECHO MEAS - FS: 29.6 %
BH CV ECHO MEAS - IVS/LVPW: 1
BH CV ECHO MEAS - IVSD: 0.73 CM
BH CV ECHO MEAS - IVSS: 0.77 CM
BH CV ECHO MEAS - LV DIASTOLIC VOL/BSA (35-75): 39.4 ML/M^2
BH CV ECHO MEAS - LV MASS(C)D: 146.8 GRAMS
BH CV ECHO MEAS - LV MASS(C)DI: 69.8 GRAMS/M^2
BH CV ECHO MEAS - LV MASS(C)S: 118.3 GRAMS
BH CV ECHO MEAS - LV MASS(C)SI: 56.2 GRAMS/M^2
BH CV ECHO MEAS - LV SYSTOLIC VOL/BSA (12-30): 13.8 ML/M^2
BH CV ECHO MEAS - LVIDD: 5.6 CM
BH CV ECHO MEAS - LVIDS: 3.9 CM
BH CV ECHO MEAS - LVLD AP4: 6.2 CM
BH CV ECHO MEAS - LVLS AP4: 5.2 CM
BH CV ECHO MEAS - LVPWD: 0.73 CM
BH CV ECHO MEAS - LVPWS: 1.2 CM
BH CV ECHO MEAS - MV A MAX VEL: 80 CM/SEC
BH CV ECHO MEAS - MV DEC SLOPE: 423.5 CM/SEC^2
BH CV ECHO MEAS - MV E MAX VEL: 67.6 CM/SEC
BH CV ECHO MEAS - MV E/A: 0.85
BH CV ECHO MEAS - MV P1/2T MAX VEL: 72.1 CM/SEC
BH CV ECHO MEAS - MV P1/2T: 49.8 MSEC
BH CV ECHO MEAS - MVA P1/2T LCG: 3.1 CM^2
BH CV ECHO MEAS - MVA(P1/2T): 4.4 CM^2
BH CV ECHO MEAS - RAP SYSTOLE: 10 MMHG
BH CV ECHO MEAS - RVDD: 0.73 CM
BH CV ECHO MEAS - RVSP: 37.5 MMHG
BH CV ECHO MEAS - SI(CUBED): 53.6 ML/M^2
BH CV ECHO MEAS - SI(MOD-SP4): 25.7 ML/M^2
BH CV ECHO MEAS - SI(TEICH): 40.5 ML/M^2
BH CV ECHO MEAS - SV(CUBED): 112.7 ML
BH CV ECHO MEAS - SV(MOD-SP4): 54 ML
BH CV ECHO MEAS - SV(TEICH): 85.1 ML
BH CV ECHO MEAS - TR MAX VEL: 262 CM/SEC
BUN BLD-MCNC: 12 MG/DL (ref 7–21)
BUN/CREAT SERPL: 14 (ref 7–25)
CALCIUM SPEC-SCNC: 8.7 MG/DL (ref 7.7–10)
CHLORIDE SERPL-SCNC: 108 MMOL/L (ref 99–112)
CK MB SERPL-CCNC: 44.07 NG/ML (ref 0–5)
CK MB SERPL-CCNC: 54.29 NG/ML (ref 0–5)
CK MB SERPL-RTO: 8.1 % (ref 0–3)
CK MB SERPL-RTO: 9.3 % (ref 0–3)
CK SERPL-CCNC: 545 U/L (ref 24–204)
CK SERPL-CCNC: 586 U/L (ref 24–204)
CO2 SERPL-SCNC: 29 MMOL/L (ref 24.3–31.9)
CREAT BLD-MCNC: 0.86 MG/DL (ref 0.43–1.29)
DEPRECATED RDW RBC AUTO: 48.7 FL (ref 37–54)
ERYTHROCYTE [DISTWIDTH] IN BLOOD BY AUTOMATED COUNT: 14.2 % (ref 11.5–14.5)
GFR SERPL CREATININE-BSD FRML MDRD: 90 ML/MIN/1.73
GLUCOSE BLD-MCNC: 105 MG/DL (ref 70–110)
HCT VFR BLD AUTO: 43 % (ref 42–52)
HGB BLD-MCNC: 13.6 G/DL (ref 14–18)
LV EF 2D ECHO EST: 60 %
MCH RBC QN AUTO: 30.7 PG (ref 27–33)
MCHC RBC AUTO-ENTMCNC: 31.6 G/DL (ref 33–37)
MCV RBC AUTO: 97.1 FL (ref 80–94)
MYOGLOBIN SERPL-MCNC: 60 NG/ML (ref 0–109)
OSMOLALITY SERPL CALC.SUM OF ELEC: 283.2 MOSM/KG (ref 273–305)
PLATELET # BLD AUTO: 176 10*3/MM3 (ref 130–400)
PMV BLD AUTO: 10.5 FL (ref 6–10)
POTASSIUM BLD-SCNC: 3.5 MMOL/L (ref 3.5–5.3)
RBC # BLD AUTO: 4.43 10*6/MM3 (ref 4.7–6.1)
SODIUM BLD-SCNC: 142 MMOL/L (ref 135–153)
TROPONIN I SERPL-MCNC: 5.96 NG/ML
TROPONIN I SERPL-MCNC: 7.12 NG/ML
TROPONIN I SERPL-MCNC: 8.06 NG/ML
WBC NRBC COR # BLD: 7.46 10*3/MM3 (ref 4.5–12.5)

## 2017-01-21 PROCEDURE — 93306 TTE W/DOPPLER COMPLETE: CPT

## 2017-01-21 PROCEDURE — 80048 BASIC METABOLIC PNL TOTAL CA: CPT | Performed by: INTERNAL MEDICINE

## 2017-01-21 PROCEDURE — 25010000002 EPTIFIBATIDE PER 5 MG: Performed by: INTERNAL MEDICINE

## 2017-01-21 PROCEDURE — 82553 CREATINE MB FRACTION: CPT | Performed by: INTERNAL MEDICINE

## 2017-01-21 PROCEDURE — 82550 ASSAY OF CK (CPK): CPT | Performed by: INTERNAL MEDICINE

## 2017-01-21 PROCEDURE — 84484 ASSAY OF TROPONIN QUANT: CPT | Performed by: INTERNAL MEDICINE

## 2017-01-21 PROCEDURE — 25010000002 HEPARIN (PORCINE) PER 1000 UNITS: Performed by: INTERNAL MEDICINE

## 2017-01-21 PROCEDURE — 93005 ELECTROCARDIOGRAM TRACING: CPT | Performed by: INTERNAL MEDICINE

## 2017-01-21 PROCEDURE — 85730 THROMBOPLASTIN TIME PARTIAL: CPT | Performed by: INTERNAL MEDICINE

## 2017-01-21 PROCEDURE — 83874 ASSAY OF MYOGLOBIN: CPT | Performed by: INTERNAL MEDICINE

## 2017-01-21 PROCEDURE — 93306 TTE W/DOPPLER COMPLETE: CPT | Performed by: INTERNAL MEDICINE

## 2017-01-21 PROCEDURE — 93010 ELECTROCARDIOGRAM REPORT: CPT | Performed by: INTERNAL MEDICINE

## 2017-01-21 PROCEDURE — 99232 SBSQ HOSP IP/OBS MODERATE 35: CPT | Performed by: INTERNAL MEDICINE

## 2017-01-21 PROCEDURE — 85027 COMPLETE CBC AUTOMATED: CPT | Performed by: INTERNAL MEDICINE

## 2017-01-21 RX ORDER — CLOPIDOGREL BISULFATE 75 MG/1
75 TABLET ORAL DAILY
Status: DISCONTINUED | OUTPATIENT
Start: 2017-01-21 | End: 2017-01-24 | Stop reason: HOSPADM

## 2017-01-21 RX ADMIN — GABAPENTIN 300 MG: 300 CAPSULE ORAL at 08:07

## 2017-01-21 RX ADMIN — PANTOPRAZOLE SODIUM 40 MG: 40 TABLET, DELAYED RELEASE ORAL at 08:07

## 2017-01-21 RX ADMIN — EPTIFIBATIDE 2 MCG/KG/MIN: 0.75 INJECTION, SOLUTION INTRAVENOUS at 03:39

## 2017-01-21 RX ADMIN — CETIRIZINE HYDROCHLORIDE 10 MG: 10 TABLET ORAL at 08:07

## 2017-01-21 RX ADMIN — CARVEDILOL 12.5 MG: 6.25 TABLET, FILM COATED ORAL at 08:08

## 2017-01-21 RX ADMIN — HEPARIN SODIUM 5000 UNITS: 5000 INJECTION, SOLUTION INTRAVENOUS; SUBCUTANEOUS at 03:35

## 2017-01-21 RX ADMIN — GABAPENTIN 300 MG: 300 CAPSULE ORAL at 20:01

## 2017-01-21 RX ADMIN — ASPIRIN 325 MG: 325 TABLET, COATED ORAL at 08:07

## 2017-01-21 RX ADMIN — CLOPIDOGREL BISULFATE 75 MG: 75 TABLET, FILM COATED ORAL at 08:28

## 2017-01-21 RX ADMIN — CARVEDILOL 12.5 MG: 6.25 TABLET, FILM COATED ORAL at 17:26

## 2017-01-21 RX ADMIN — HEPARIN SODIUM 16 UNITS/KG/HR: 10000 INJECTION, SOLUTION INTRAVENOUS at 15:25

## 2017-01-21 NOTE — PLAN OF CARE
Problem: Patient Care Overview (Adult)  Goal: Plan of Care Review  Outcome: Ongoing (interventions implemented as appropriate)    01/21/17 0824   Coping/Psychosocial Response Interventions   Plan Of Care Reviewed With patient;spouse;family   Patient Care Overview   Progress progress toward functional goals as expected         Problem: Fall Risk (Adult)  Goal: Identify Related Risk Factors and Signs and Symptoms  Outcome: Ongoing (interventions implemented as appropriate)    01/21/17 0824   Fall Risk   Fall Risk: Related Risk Factors environment unfamiliar         Problem: Infection, Risk/Actual (Adult)  Goal: Identify Related Risk Factors and Signs and Symptoms  Outcome: Ongoing (interventions implemented as appropriate)

## 2017-01-21 NOTE — H&P
Chief complaint  Chest pain    History of present illness:  Keith is a very pleasant 61-year-old gentleman who presented approximately 10 years ago with a myocardial infarction.  At the time he was told that he had significant damage to his heart.  He was treated medically and has done well over the past 10 years.  He states that yesterday he developed recurrent episodes of chest pressure that would resolve with rest.  Today he states that he got up and went to his truck and just walking to his truck had recurrent chest pressure.  He notes that he's also been short of breath with the chest pain.  He states that he has had intermittent palpitations.  He has not had significant cardiology follow-up over the last several years.    His 14 point review of systems is negative as was otherwise mentioned in the history of present illness.    Past medical history:  Coronary artery disease  Myocardial infarction  Hypertension  Hyperlipidemia  Ischemic cardiomyopathy    Current medications:  Aspirin 325 mg daily  Coreg 12.5 mg twice a day  Zyrtec 10 mg daily  Neurontin 300 mg twice a day  Mobic 15 mg daily  Protonix 40 mg daily    He has no known drug allergies.    Social history is notable for no significant tobacco, alcohol or illicit drug use    Family history is notable for a father and mother with coronary artery disease albeit at an elderly age    Current physical examination:  Blood pressure is 130/70 rate is 80  In general he is well-nourished well-developed gentleman in no apparent distress, alert and oriented ×3  HEENT exam reveals his oral mucosa to be normal.  He has no significant JVD or hepatojugular reflex.  He has no carotid bruits noted.  Chest is symmetric  Lungs are clear to auscultation without crackles or wheezes  Cardiac exam reveals an intact PMI with a regular rate and rhythm.  There is a normal S1 and S2.  There is no S3 or S4.  There are no murmurs rubs or bruits.  Abdominal exam reveals a soft belly  which is nontender with normal bowel sounds and no hepatosplenomegaly.  His abdominal aorta is not palpable  Extremities reveal no clubbing cyanosis or edema  Peripheral pulses are intact  Gait is normal  Musculoskeletal exam is normal  Neurologic exam is normal    Current laboratory data:  EKG reveals normal sinus rhythm with normal intervals and durations.  There are T-wave inversions inferiorly    Final impression and plan:  Overall it is my concern that Mr. Craig is presenting with unstable angina.  He will be admitted to the hospital and undergo cardiac catheterization at the earliest possible time or emergently should his symptoms progress.  Of note he is currently having no chest discomfort whatsoever

## 2017-01-21 NOTE — PROGRESS NOTES
LOS: 1 day   Patient Care Team:  KISHAN Dumont as PCP - General  KISHAN Dumont as PCP - Family Medicine    Chief Complaint:Keith Craig a 61 y.o. male presents with known coronary artery disease and a non-ST elevation myocardial infarction       Interval History: He has had no further chest pain      Objective   Vital Signs  Temp:  [97.7 °F (36.5 °C)-98.7 °F (37.1 °C)] 97.7 °F (36.5 °C)  Heart Rate:  [48-66] 52  Resp:  [16-20] 16  BP: ()/() 121/88    Intake/Output Summary (Last 24 hours) at 01/21/17 0828  Last data filed at 01/21/17 0600   Gross per 24 hour   Intake    230 ml   Output    425 ml   Net   -195 ml       Comfortable NAD  PERRL, conjunctiva clear  Neck supple, no JVD or thyromegaly appreciated  S1/S2 RRR, no m/r/g  Lungs CTA B, normal effort  Abdomen S/NT/ND (+) BS, no HSM appreciated  Extremities warm, no clubbing, cyanosis, or edema  Normal gait  No visible or palpable skin lesions  A/Ox4, mood and affect appropriate    Results Review:        Results from last 7 days  Lab Units 01/21/17 0145 01/20/17  1845   SODIUM mmol/L 142 145   POTASSIUM mmol/L 3.5 4.0   CHLORIDE mmol/L 108 108   TOTAL CO2 mmol/L 29.0 30.2   BUN mg/dL 12 12   CREATININE mg/dL 0.86 0.97   GLUCOSE mg/dL 105 87   CALCIUM mg/dL 8.7 9.3       Results from last 7 days  Lab Units 01/21/17 0145 01/20/17 2055 01/20/17  1845   CK TOTAL U/L 586* 665*  --    TROPONIN I ng/mL 8.062* 7.728* 5.638*   CK MB INDEX % 9.3* 9.2*  --        Results from last 7 days  Lab Units 01/21/17 0145 01/20/17 2055 01/20/17 1845   WBC 10*3/mm3 7.46 8.08 6.21   HEMOGLOBIN g/dL 13.6* 14.1 14.0   HEMATOCRIT % 43.0 43.4 44.0   PLATELETS 10*3/mm3 176 185 184       Results from last 7 days  Lab Units 01/21/17  0145 01/20/17 2001 01/20/17  1845   INR   --  0.94 0.93   APTT seconds 42.6* 25.0 24.4       Results from last 7 days  Lab Units 01/20/17  1845   CHOLESTEROL mg/dL 198           Results from last 7 days  Lab Units  01/20/17  1845   CHOLESTEROL mg/dL 198   TRIGLYCERIDES mg/dL 202*   HDL CHOL mg/dL 35*       I reviewed the patient's new clinical results.  I personally viewed and interpreted the patient's EKG/Telemetry data        Medication Review:     aspirin  mg Oral Daily   carvedilol 12.5 mg Oral BID With Meals   cetirizine 10 mg Oral Daily   clopidogrel 75 mg Oral Daily   gabapentin 300 mg Oral Q12H   pantoprazole 40 mg Oral Daily         eptifibatide 2 mcg/kg/min Last Rate: 2 mcg/kg/min (01/21/17 0339)   heparin (porcine) 1,000 Units/hr Last Rate: 16 Units/kg/hr (01/21/17 0336)       Active Problems:    Chest pain of unknown etiology    Non-STEMI (non-ST elevated myocardial infarction)      Assessment/Plan   Non-STEMI  He is currently completely pain free and his enzymes are trending down.  I will continue his Integrilin for another 3 hours and continue him on heparin thereafter.  I anticipate cardiac catheterization on Monday or sooner should he have recurrent chest pain    Larry Fernandez MD  01/21/17  8:28 AM

## 2017-01-21 NOTE — PROGRESS NOTES
Discharge Planning Assessment   [facility]     Patient Name: Keith Craig  MRN: [id number]  Today's Date: 1/21/2017    Admit Date: 1/20/2017          Discharge Needs Assessment       01/21/17 1539    Living Environment    Lives With spouse   Isabelle    Living Arrangements mobile home    Provides Primary Care For no one    Quality Of Family Relationships supportive    Able to Return to Prior Living Arrangements yes    Discharge Needs Assessment    Concerns To Be Addressed no discharge needs identified;denies needs/concerns at this time    Readmission Within The Last 30 Days no previous admission in last 30 days    Anticipated Changes Related to Illness none    Equipment Currently Used at Home none    Equipment Needed After Discharge none    Transportation Available car;family or friend will provide    Discharge Disposition still a patient            Discharge Plan       01/21/17 1539    Case Management/Social Work Plan    Plan Plans to return home with wife he is indep with ADLs and has no needs at this time.  CM will follow.    Patient/Family In Agreement With Plan yes        Discharge Placement     No information found                Demographic Summary       01/21/17 1536    Referral Information    Admission Type inpatient    Arrived From admitted as an inpatient;home or self-care   Admit for chest pain NSTEMI on integrillin gtt and heaprin gtt planned cardiac catherization on Monday unless c/w pain may require sooner.    Referral Source admission list    Primary Care Physician Information    Name Daryn Helton,             Functional Status       01/21/17 1538    Functional Status Current    Communication 0-->understands/communicates without difficulty    Swallowing (if score 2 or more for any item, consult Rehab Services) 0-->swallows foods/liquids without difficulty    Change in Functional Status Since Onset of Current Illness/Injury no    Functional Status Prior    Ambulation 0-->independent    Transferring  0-->independent    Toileting 0-->independent    Bathing 0-->independent    Dressing 0-->independent    Eating 0-->independent    Communication 0-->understands/communicates without difficulty    Swallowing 0-->swallows foods/liquids without difficulty    IADL    Medications independent   Doriety drug in [facility] he denied any issue paying for medications at this time.    Meal Preparation independent    Housekeeping independent    Laundry independent    Shopping independent    Oral Care independent    Employment/Financial    Financial Concerns none            Psychosocial     None            Abuse/Neglect     None            Legal       01/21/17 5155    Legal    Legal Comments Deneid having or need for POA/AD            Substance Abuse     None            Patient Forms     None          Nancy Lubin RN

## 2017-01-22 LAB
APTT PPP: 67.5 SECONDS (ref 24.4–31)
DEPRECATED RDW RBC AUTO: 49.6 FL (ref 37–54)
ERYTHROCYTE [DISTWIDTH] IN BLOOD BY AUTOMATED COUNT: 14.2 % (ref 11.5–14.5)
HCT VFR BLD AUTO: 42.7 % (ref 42–52)
HGB BLD-MCNC: 13.7 G/DL (ref 14–18)
MCH RBC QN AUTO: 31.4 PG (ref 27–33)
MCHC RBC AUTO-ENTMCNC: 32.1 G/DL (ref 33–37)
MCV RBC AUTO: 97.7 FL (ref 80–94)
PLATELET # BLD AUTO: 164 10*3/MM3 (ref 130–400)
PMV BLD AUTO: 10.8 FL (ref 6–10)
RBC # BLD AUTO: 4.37 10*6/MM3 (ref 4.7–6.1)
WBC NRBC COR # BLD: 7.8 10*3/MM3 (ref 4.5–12.5)

## 2017-01-22 PROCEDURE — 99232 SBSQ HOSP IP/OBS MODERATE 35: CPT | Performed by: INTERNAL MEDICINE

## 2017-01-22 PROCEDURE — 85027 COMPLETE CBC AUTOMATED: CPT | Performed by: INTERNAL MEDICINE

## 2017-01-22 PROCEDURE — 85730 THROMBOPLASTIN TIME PARTIAL: CPT | Performed by: INTERNAL MEDICINE

## 2017-01-22 PROCEDURE — 25010000002 HEPARIN (PORCINE) PER 1000 UNITS: Performed by: INTERNAL MEDICINE

## 2017-01-22 RX ORDER — DIAZEPAM 5 MG/1
5 TABLET ORAL ONCE
Status: COMPLETED | OUTPATIENT
Start: 2017-01-23 | End: 2017-01-23

## 2017-01-22 RX ORDER — DIPHENHYDRAMINE HCL 25 MG
25 CAPSULE ORAL EVERY 6 HOURS PRN
Status: DISCONTINUED | OUTPATIENT
Start: 2017-01-22 | End: 2017-01-23

## 2017-01-22 RX ORDER — SODIUM CHLORIDE 9 MG/ML
1-3 INJECTION, SOLUTION INTRAVENOUS CONTINUOUS
Status: DISCONTINUED | OUTPATIENT
Start: 2017-01-22 | End: 2017-01-24 | Stop reason: HOSPADM

## 2017-01-22 RX ADMIN — ASPIRIN 325 MG: 325 TABLET, COATED ORAL at 08:43

## 2017-01-22 RX ADMIN — PANTOPRAZOLE SODIUM 40 MG: 40 TABLET, DELAYED RELEASE ORAL at 08:43

## 2017-01-22 RX ADMIN — GABAPENTIN 300 MG: 300 CAPSULE ORAL at 08:43

## 2017-01-22 RX ADMIN — HEPARIN SODIUM 1416 UNITS/HR: 10000 INJECTION, SOLUTION INTRAVENOUS at 10:58

## 2017-01-22 RX ADMIN — CARVEDILOL 12.5 MG: 6.25 TABLET, FILM COATED ORAL at 17:13

## 2017-01-22 RX ADMIN — GABAPENTIN 300 MG: 300 CAPSULE ORAL at 20:34

## 2017-01-22 RX ADMIN — CARVEDILOL 12.5 MG: 6.25 TABLET, FILM COATED ORAL at 08:43

## 2017-01-22 RX ADMIN — CETIRIZINE HYDROCHLORIDE 10 MG: 10 TABLET ORAL at 08:43

## 2017-01-22 RX ADMIN — CLOPIDOGREL BISULFATE 75 MG: 75 TABLET, FILM COATED ORAL at 08:43

## 2017-01-22 NOTE — PLAN OF CARE
Problem: Patient Care Overview (Adult)  Goal: Plan of Care Review  Outcome: Ongoing (interventions implemented as appropriate)  Goal: Adult Individualization and Mutuality  Outcome: Ongoing (interventions implemented as appropriate)  Goal: Discharge Needs Assessment  Outcome: Ongoing (interventions implemented as appropriate)    Problem: Fall Risk (Adult)  Goal: Identify Related Risk Factors and Signs and Symptoms  Outcome: Ongoing (interventions implemented as appropriate)  Goal: Absence of Falls  Outcome: Ongoing (interventions implemented as appropriate)    Problem: Infection, Risk/Actual (Adult)  Goal: Identify Related Risk Factors and Signs and Symptoms  Outcome: Ongoing (interventions implemented as appropriate)  Goal: Infection Prevention/Resolution  Outcome: Ongoing (interventions implemented as appropriate)

## 2017-01-22 NOTE — PLAN OF CARE
Problem: Patient Care Overview (Adult)  Goal: Plan of Care Review  Outcome: Ongoing (interventions implemented as appropriate)  Goal: Discharge Needs Assessment  Outcome: Ongoing (interventions implemented as appropriate)    Problem: Fall Risk (Adult)  Goal: Identify Related Risk Factors and Signs and Symptoms  Outcome: Ongoing (interventions implemented as appropriate)  Goal: Absence of Falls  Outcome: Ongoing (interventions implemented as appropriate)

## 2017-01-22 NOTE — PROGRESS NOTES
LOS: 2 days   Patient Care Team:  KISHAN Dumont as PCP - General  KISHAN Dumont as PCP - Family Medicine    Chief Complaint:Keith Craig a 61 y.o. male presents with a non-ST elevation myocardial infarction.       Interval History: He has had no further chest pain      Objective   Vital Signs  Temp:  [97.5 °F (36.4 °C)-98.2 °F (36.8 °C)] 98.2 °F (36.8 °C)  Heart Rate:  [54-72] 63  Resp:  [16-18] 18  BP: ()/(46-88) 111/78    Intake/Output Summary (Last 24 hours) at 01/22/17 0858  Last data filed at 01/22/17 0559   Gross per 24 hour   Intake 390.71 ml   Output    850 ml   Net -459.29 ml       Comfortable NAD  PERRL, conjunctiva clear  Neck supple, no JVD or thyromegaly appreciated  S1/S2 RRR, no m/r/g  Lungs CTA B, normal effort  Abdomen S/NT/ND (+) BS, no HSM appreciated  Extremities warm, no clubbing, cyanosis, or edema  Normal gait  No visible or palpable skin lesions  A/Ox4, mood and affect appropriate    Results Review:        Results from last 7 days  Lab Units 01/21/17  0145 01/20/17  1845   SODIUM mmol/L 142 145   POTASSIUM mmol/L 3.5 4.0   CHLORIDE mmol/L 108 108   TOTAL CO2 mmol/L 29.0 30.2   BUN mg/dL 12 12   CREATININE mg/dL 0.86 0.97   GLUCOSE mg/dL 105 87   CALCIUM mg/dL 8.7 9.3       Results from last 7 days  Lab Units 01/21/17  1346 01/21/17  0815 01/21/17  0145 01/20/17 2055   CK TOTAL U/L  --  545* 586* 665*   TROPONIN I ng/mL 5.963* 7.116* 8.062* 7.728*   CK MB INDEX %  --  8.1* 9.3* 9.2*       Results from last 7 days  Lab Units 01/22/17  0042 01/21/17  0145 01/20/17  2055   WBC 10*3/mm3 7.80 7.46 8.08   HEMOGLOBIN g/dL 13.7* 13.6* 14.1   HEMATOCRIT % 42.7 43.0 43.4   PLATELETS 10*3/mm3 164 176 185       Results from last 7 days  Lab Units 01/22/17  0042 01/21/17  1346 01/21/17  0815  01/20/17 2001 01/20/17  1845   INR   --   --   --   --  0.94 0.93   APTT seconds 67.5* 64.9* 66.0*  < > 25.0 24.4   < > = values in this interval not displayed.    Results from last  7 days  Lab Units 01/20/17  1845   CHOLESTEROL mg/dL 198           Results from last 7 days  Lab Units 01/20/17  1845   CHOLESTEROL mg/dL 198   TRIGLYCERIDES mg/dL 202*   HDL CHOL mg/dL 35*       I reviewed the patient's new clinical results.  I personally viewed and interpreted the patient's EKG/Telemetry data        Medication Review:     aspirin  mg Oral Daily   carvedilol 12.5 mg Oral BID With Meals   cetirizine 10 mg Oral Daily   clopidogrel 75 mg Oral Daily   gabapentin 300 mg Oral Q12H   pantoprazole 40 mg Oral Daily         heparin (porcine) 1,000 Units/hr Last Rate: 16 Units/kg/hr (01/21/17 1525)       Active Problems:    Chest pain of unknown etiology    Non-STEMI (non-ST elevated myocardial infarction)      Assessment/Plan   Non-STEMI  We will proceed with cardiac catheterization in the morning.  The patient has been given informed consent.  He understands the risks and benefits to include the risk of heart attack, stroke and death.  We will proceed tomorrow morning    Larry Fernandez MD  01/22/17  8:58 AM

## 2017-01-23 LAB
ACT BLD: 157 SECONDS (ref 82–152)
ACT BLD: 162 SECONDS (ref 82–152)
ACT BLD: 162 SECONDS (ref 82–152)
ACT BLD: 167 SECONDS (ref 82–152)
ACT BLD: 173 SECONDS (ref 82–152)
ACT BLD: 188 SECONDS (ref 82–152)
ACT BLD: 322 SECONDS (ref 82–152)
APTT PPP: 61.6 SECONDS (ref 24.4–31)
DEPRECATED RDW RBC AUTO: 49.7 FL (ref 37–54)
ERYTHROCYTE [DISTWIDTH] IN BLOOD BY AUTOMATED COUNT: 14.3 % (ref 11.5–14.5)
HCT VFR BLD AUTO: 43.4 % (ref 42–52)
HGB BLD-MCNC: 13.7 G/DL (ref 14–18)
MCH RBC QN AUTO: 31 PG (ref 27–33)
MCHC RBC AUTO-ENTMCNC: 31.6 G/DL (ref 33–37)
MCV RBC AUTO: 98.2 FL (ref 80–94)
PLATELET # BLD AUTO: 175 10*3/MM3 (ref 130–400)
PMV BLD AUTO: 10.7 FL (ref 6–10)
RBC # BLD AUTO: 4.42 10*6/MM3 (ref 4.7–6.1)
WBC NRBC COR # BLD: 7.27 10*3/MM3 (ref 4.5–12.5)

## 2017-01-23 PROCEDURE — 99024 POSTOP FOLLOW-UP VISIT: CPT | Performed by: INTERNAL MEDICINE

## 2017-01-23 PROCEDURE — 027034Z DILATION OF CORONARY ARTERY, ONE ARTERY WITH DRUG-ELUTING INTRALUMINAL DEVICE, PERCUTANEOUS APPROACH: ICD-10-PCS | Performed by: INTERNAL MEDICINE

## 2017-01-23 PROCEDURE — C1874 STENT, COATED/COV W/DEL SYS: HCPCS | Performed by: INTERNAL MEDICINE

## 2017-01-23 PROCEDURE — 93005 ELECTROCARDIOGRAM TRACING: CPT | Performed by: INTERNAL MEDICINE

## 2017-01-23 PROCEDURE — C1725 CATH, TRANSLUMIN NON-LASER: HCPCS | Performed by: INTERNAL MEDICINE

## 2017-01-23 PROCEDURE — C1874 STENT, COATED/COV W/DEL SYS: HCPCS

## 2017-01-23 PROCEDURE — C1887 CATHETER, GUIDING: HCPCS | Performed by: INTERNAL MEDICINE

## 2017-01-23 PROCEDURE — 94799 UNLISTED PULMONARY SVC/PX: CPT

## 2017-01-23 PROCEDURE — 93010 ELECTROCARDIOGRAM REPORT: CPT | Performed by: INTERNAL MEDICINE

## 2017-01-23 PROCEDURE — C1769 GUIDE WIRE: HCPCS | Performed by: INTERNAL MEDICINE

## 2017-01-23 PROCEDURE — 93454 CORONARY ARTERY ANGIO S&I: CPT | Performed by: INTERNAL MEDICINE

## 2017-01-23 PROCEDURE — C1894 INTRO/SHEATH, NON-LASER: HCPCS | Performed by: INTERNAL MEDICINE

## 2017-01-23 PROCEDURE — 85027 COMPLETE CBC AUTOMATED: CPT | Performed by: INTERNAL MEDICINE

## 2017-01-23 PROCEDURE — C9600 PERC DRUG-EL COR STENT SING: HCPCS | Performed by: INTERNAL MEDICINE

## 2017-01-23 PROCEDURE — 25010000002 FENTANYL CITRATE (PF) 100 MCG/2ML SOLUTION: Performed by: INTERNAL MEDICINE

## 2017-01-23 PROCEDURE — 92928 PRQ TCAT PLMT NTRAC ST 1 LES: CPT | Performed by: INTERNAL MEDICINE

## 2017-01-23 PROCEDURE — 85347 COAGULATION TIME ACTIVATED: CPT

## 2017-01-23 PROCEDURE — 0 IOPAMIDOL PER 1 ML: Performed by: INTERNAL MEDICINE

## 2017-01-23 PROCEDURE — 85730 THROMBOPLASTIN TIME PARTIAL: CPT | Performed by: INTERNAL MEDICINE

## 2017-01-23 PROCEDURE — 25010000002 MIDAZOLAM PER 1 MG: Performed by: INTERNAL MEDICINE

## 2017-01-23 PROCEDURE — C1757 CATH, THROMBECTOMY/EMBOLECT: HCPCS | Performed by: INTERNAL MEDICINE

## 2017-01-23 PROCEDURE — 63710000001 DIPHENHYDRAMINE PER 50 MG: Performed by: INTERNAL MEDICINE

## 2017-01-23 PROCEDURE — 25010000002 HEPARIN (PORCINE) PER 1000 UNITS: Performed by: INTERNAL MEDICINE

## 2017-01-23 DEVICE — XIENCE ALPINE EVEROLIMUS ELUTING CORONARY STENT SYSTEM 3.00 MM X 28 MM / RAPID-EXCHANGE
Type: IMPLANTABLE DEVICE | Status: FUNCTIONAL
Brand: XIENCE ALPINE

## 2017-01-23 RX ORDER — SODIUM CHLORIDE 9 MG/ML
100 INJECTION, SOLUTION INTRAVENOUS CONTINUOUS
Status: DISCONTINUED | OUTPATIENT
Start: 2017-01-23 | End: 2017-01-24 | Stop reason: HOSPADM

## 2017-01-23 RX ORDER — NITROGLYCERIN 5 MG/ML
INJECTION, SOLUTION INTRAVENOUS AS NEEDED
Status: DISCONTINUED | OUTPATIENT
Start: 2017-01-23 | End: 2017-01-23 | Stop reason: HOSPADM

## 2017-01-23 RX ORDER — HYDROCODONE BITARTRATE AND ACETAMINOPHEN 7.5; 325 MG/1; MG/1
1 TABLET ORAL EVERY 6 HOURS PRN
Status: DISCONTINUED | OUTPATIENT
Start: 2017-01-23 | End: 2017-01-24 | Stop reason: HOSPADM

## 2017-01-23 RX ORDER — MIDAZOLAM HYDROCHLORIDE 1 MG/ML
INJECTION INTRAMUSCULAR; INTRAVENOUS AS NEEDED
Status: DISCONTINUED | OUTPATIENT
Start: 2017-01-23 | End: 2017-01-23 | Stop reason: HOSPADM

## 2017-01-23 RX ORDER — PHENYLEPHRINE HCL IN 0.9% NACL 0.5 MG/5ML
SYRINGE (ML) INTRAVENOUS AS NEEDED
Status: DISCONTINUED | OUTPATIENT
Start: 2017-01-23 | End: 2017-01-23 | Stop reason: HOSPADM

## 2017-01-23 RX ORDER — LIDOCAINE HYDROCHLORIDE 20 MG/ML
INJECTION, SOLUTION INFILTRATION; PERINEURAL AS NEEDED
Status: DISCONTINUED | OUTPATIENT
Start: 2017-01-23 | End: 2017-01-23 | Stop reason: HOSPADM

## 2017-01-23 RX ORDER — SODIUM CHLORIDE 9 MG/ML
INJECTION, SOLUTION INTRAVENOUS CONTINUOUS PRN
Status: DISCONTINUED | OUTPATIENT
Start: 2017-01-23 | End: 2017-01-23 | Stop reason: HOSPADM

## 2017-01-23 RX ORDER — CARVEDILOL 6.25 MG/1
6.25 TABLET ORAL 2 TIMES DAILY WITH MEALS
Status: DISCONTINUED | OUTPATIENT
Start: 2017-01-23 | End: 2017-01-24

## 2017-01-23 RX ORDER — FENTANYL CITRATE 50 UG/ML
INJECTION, SOLUTION INTRAMUSCULAR; INTRAVENOUS AS NEEDED
Status: DISCONTINUED | OUTPATIENT
Start: 2017-01-23 | End: 2017-01-23 | Stop reason: HOSPADM

## 2017-01-23 RX ORDER — HEPARIN SODIUM 1000 [USP'U]/ML
INJECTION, SOLUTION INTRAVENOUS; SUBCUTANEOUS AS NEEDED
Status: DISCONTINUED | OUTPATIENT
Start: 2017-01-23 | End: 2017-01-23 | Stop reason: HOSPADM

## 2017-01-23 RX ADMIN — HYDROCODONE BITARTRATE AND ACETAMINOPHEN 1 TABLET: 7.5; 325 TABLET ORAL at 18:49

## 2017-01-23 RX ADMIN — HEPARIN SODIUM 940 UNITS/HR: 10000 INJECTION, SOLUTION INTRAVENOUS at 04:05

## 2017-01-23 RX ADMIN — SODIUM CHLORIDE 100 ML/HR: 9 INJECTION, SOLUTION INTRAVENOUS at 13:20

## 2017-01-23 RX ADMIN — CLOPIDOGREL BISULFATE 75 MG: 75 TABLET, FILM COATED ORAL at 07:53

## 2017-01-23 RX ADMIN — GABAPENTIN 300 MG: 300 CAPSULE ORAL at 22:23

## 2017-01-23 RX ADMIN — DIPHENHYDRAMINE HYDROCHLORIDE 25 MG: 25 CAPSULE ORAL at 08:03

## 2017-01-23 RX ADMIN — ASPIRIN 325 MG: 325 TABLET, COATED ORAL at 07:54

## 2017-01-23 RX ADMIN — DIAZEPAM 5 MG: 5 TABLET ORAL at 07:53

## 2017-01-23 RX ADMIN — HYDROCODONE BITARTRATE AND ACETAMINOPHEN 1 TABLET: 7.5; 325 TABLET ORAL at 12:48

## 2017-01-23 RX ADMIN — CARVEDILOL 6.25 MG: 6.25 TABLET, FILM COATED ORAL at 18:13

## 2017-01-23 NOTE — PROGRESS NOTES
LOS: 3 days   Patient Care Team:  KISHAN Dumont as PCP - General  KISHAN Dumont as PCP - Family Medicine    Chief Complaint:Keith Craig a 61 y.o. male presents with a non-ST elevation myocardial infarction.       Interval History: He underwent PCI of the right coronary artery this morning which was uncomplicated      Objective   Vital Signs  Temp:  [97.7 °F (36.5 °C)-98.2 °F (36.8 °C)] 98 °F (36.7 °C)  Heart Rate:  [50-77] 61  Resp:  [12-18] 14  BP: ()/(58-88) 123/79    Intake/Output Summary (Last 24 hours) at 01/23/17 0931  Last data filed at 01/23/17 0923   Gross per 24 hour   Intake 1191.64 ml   Output   1425 ml   Net -233.36 ml       Comfortable NAD  PERRL, conjunctiva clear  Neck supple, no JVD or thyromegaly appreciated  S1/S2 RRR, no m/r/g  Lungs CTA B, normal effort  Abdomen S/NT/ND (+) BS, no HSM appreciated  Extremities warm, no clubbing, cyanosis, or edema  Normal gait  No visible or palpable skin lesions  A/Ox4, mood and affect appropriate    Results Review:        Results from last 7 days  Lab Units 01/21/17  0145 01/20/17  1845   SODIUM mmol/L 142 145   POTASSIUM mmol/L 3.5 4.0   CHLORIDE mmol/L 108 108   TOTAL CO2 mmol/L 29.0 30.2   BUN mg/dL 12 12   CREATININE mg/dL 0.86 0.97   GLUCOSE mg/dL 105 87   CALCIUM mg/dL 8.7 9.3       Results from last 7 days  Lab Units 01/21/17  1346 01/21/17  0815 01/21/17  0145 01/20/17  2055   CK TOTAL U/L  --  545* 586* 665*   TROPONIN I ng/mL 5.963* 7.116* 8.062* 7.728*   CK MB INDEX %  --  8.1* 9.3* 9.2*       Results from last 7 days  Lab Units 01/23/17  0044 01/22/17  0042 01/21/17  0145   WBC 10*3/mm3 7.27 7.80 7.46   HEMOGLOBIN g/dL 13.7* 13.7* 13.6*   HEMATOCRIT % 43.4 42.7 43.0   PLATELETS 10*3/mm3 175 164 176       Results from last 7 days  Lab Units 01/23/17  0044 01/22/17  0042 01/21/17  1346  01/20/17 2001 01/20/17  1845   INR   --   --   --   --  0.94 0.93   APTT seconds 61.6* 67.5* 64.9*  < > 25.0 24.4   < > = values in  this interval not displayed.    Results from last 7 days  Lab Units 01/20/17  1845   CHOLESTEROL mg/dL 198           Results from last 7 days  Lab Units 01/20/17  1845   CHOLESTEROL mg/dL 198   TRIGLYCERIDES mg/dL 202*   HDL CHOL mg/dL 35*       I reviewed the patient's new clinical results.  I personally viewed and interpreted the patient's EKG/Telemetry data        Medication Review:     aspirin  mg Oral Daily   carvedilol 6.25 mg Oral BID With Meals   cetirizine 10 mg Oral Daily   clopidogrel 75 mg Oral Daily   gabapentin 300 mg Oral Q12H   pantoprazole 40 mg Oral Daily         heparin (porcine) 1,000 Units/hr Last Rate: Stopped (01/23/17 0746)   sodium chloride 1-3 mL/kg/hr    sodium chloride  Last Rate: 100 mL/hr (01/23/17 0820)   sodium chloride 100 mL/hr        Active Problems:    Chest pain of unknown etiology    Non-STEMI (non-ST elevated myocardial infarction)      Assessment/Plan   Non-STEMI  We'll likely be okay for discharge in the morning    Larry Fernandez MD  01/23/17  9:31 AM

## 2017-01-23 NOTE — PLAN OF CARE
Problem: Patient Care Overview (Adult)  Goal: Plan of Care Review  Outcome: Ongoing (interventions implemented as appropriate)  Goal: Adult Individualization and Mutuality  Outcome: Ongoing (interventions implemented as appropriate)    Problem: Infection, Risk/Actual (Adult)  Goal: Identify Related Risk Factors and Signs and Symptoms  Outcome: Ongoing (interventions implemented as appropriate)  Goal: Infection Prevention/Resolution  Outcome: Ongoing (interventions implemented as appropriate)

## 2017-01-23 NOTE — PAYOR COMM NOTE
"CONTACT:  SOPHIE NGUYỄN RN, BSN  UTILIZATION MANAGEMENT DEPT.  [facility]  [address] [facility], [address]  PHONE:  [phone]  FAX: [phone]    REQUEST FOR INPATIENT AUTHORIZATION.    Keith Craig (61 y.o. Male)     Date of Birth Social Security Number Address Home Phone MRN    [id number]  [id number] [address] [facility] [address] [phone] [id number]    [employer] Mar[employer] Status          Zoroastrian        Admission Date Admission Type Admitting Provider Attending Provider Department, Room/Bed    1/20/17 Emergency Larry Fernandez MD Filardo, Steven D, MD [facility] CRITICAL CARE, [facility]/    Discharge Date Discharge Disposition Discharge Destination                      Attending Provider: Larry Fernandez MD     Allergies:  No Known Allergies    Isolation:  None   Infection:  None   Code Status:  FULL    Ht:  71\" (180.3 cm)   Wt:  200 lb (90.7 kg)    Admission Cmt:  None   Principal Problem:  None                Active Insurance as of 1/20/2017     Primary Coverage     Payor Plan Insurance Group Employer/Plan Group    ANTHEM BLUE CROSS ANTHEM BLUE CROSS BLUE SHIELD PPO [id number]     Payor Plan Address Payor Plan Phone Number Effective From Effective To    [address] [phone] 1/3/2017     [place] [address]       Subscriber Name Subscriber Birth Date Member ID       BAKARI CRAIG 12/15/1958 [id number]                 Emergency Contacts      (Rel.) Home Phone Work Phone Mobile Phone    Bakari Craig (Spouse) [phone] -- --               History & Physical      Larry Fernandez MD at 1/20/2017  8:32 PM          Chief complaint  Chest pain    History of present illness:  Keith is a very pleasant 61-year-old gentleman who presented approximately 10 years ago with a myocardial infarction.  At the time he was told that he had significant damage to his heart.  He was treated medically and has done well over the past 10 years.  He " states that yesterday he developed recurrent episodes of chest pressure that would resolve with rest.  Today he states that he got up and went to his truck and just walking to his truck had recurrent chest pressure.  He notes that he's also been short of breath with the chest pain.  He states that he has had intermittent palpitations.  He has not had significant cardiology follow-up over the last several years.    His 14 point review of systems is negative as was otherwise mentioned in the history of present illness.    Past medical history:  Coronary artery disease  Myocardial infarction  Hypertension  Hyperlipidemia  Ischemic cardiomyopathy    Current medications:  Aspirin 325 mg daily  Coreg 12.5 mg twice a day  Zyrtec 10 mg daily  Neurontin 300 mg twice a day  Mobic 15 mg daily  Protonix 40 mg daily    He has no known drug allergies.    Social history is notable for no significant tobacco, alcohol or illicit drug use    Family history is notable for a father and mother with coronary artery disease albeit at an elderly age    Current physical examination:  Blood pressure is 130/70 rate is 80  In general he is well-nourished well-developed gentleman in no apparent distress, alert and oriented ×3  HEENT exam reveals his oral mucosa to be normal.  He has no significant JVD or hepatojugular reflex.  He has no carotid bruits noted.  Chest is symmetric  Lungs are clear to auscultation without crackles or wheezes  Cardiac exam reveals an intact PMI with a regular rate and rhythm.  There is a normal S1 and S2.  There is no S3 or S4.  There are no murmurs rubs or bruits.  Abdominal exam reveals a soft belly which is nontender with normal bowel sounds and no hepatosplenomegaly.  His abdominal aorta is not palpable  Extremities reveal no clubbing cyanosis or edema  Peripheral pulses are intact  Gait is normal  Musculoskeletal exam is normal  Neurologic exam is normal    Current laboratory data:  EKG reveals normal sinus  rhythm with normal intervals and durations.  There are T-wave inversions inferiorly    Final impression and plan:  Overall it is my concern that Mr. Craig is presenting with unstable angina.  He will be admitted to the hospital and undergo cardiac catheterization at the earliest possible time or emergently should his symptoms progress.  Of note he is currently having no chest discomfort whatsoever     Electronically signed by Larry Fernandez MD at 1/20/2017  8:36 PM           Emergency Department Notes      Brett Santos MD at 1/20/2017  6:17 PM          Subjective   Patient is a 61 y.o. male presenting with chest pain.   History provided by:  Patient  Chest Pain   Pain location:  Substernal area  Pain quality: tightness    Pain radiates to:  L shoulder and R shoulder  Pain severity:  Moderate  Onset quality:  Sudden  Timing:  Intermittent  Progression:  Resolved  Chronicity:  New  Relieved by:  Rest  Worsened by:  Exertion  Associated symptoms: abdominal pain    Associated symptoms: no fever, no nausea, no shortness of breath and no vomiting        Review of Systems   Constitutional: Negative for activity change and fever.   HENT: Negative.    Eyes: Negative for discharge.   Respiratory: Negative for shortness of breath.    Cardiovascular: Positive for chest pain.   Gastrointestinal: Positive for abdominal pain. Negative for diarrhea, nausea and vomiting.   Genitourinary: Negative for difficulty urinating and flank pain.   Musculoskeletal: Negative for arthralgias and myalgias.   Skin: Negative for rash and wound.   Psychiatric/Behavioral: Negative for agitation. other systems reviewed and are negative.      History reviewed. No pertinent past medical history.    No Known Allergies    History reviewed. No pertinent past surgical history.    No family history on file.    Social History     Social History   • Marital status:      Spouse name: N/A   • Number of children: N/A   • Years of education: N/A      Social History Main Topics   • Smoking status: None   • Smokeless tobacco: None   • Alcohol use None   • Drug use: None   • Sexual activity: Not Asked     Other Topics Concern   • None     Social History Narrative   • None           Objective   Physical Exam   Constitutional: He is oriented to person, place, and time. He appears well-developed and well-nourished.   HENT:   Head: Normocephalic.   Right Ear: External ear normal.   Left Ear: External ear normal.   Nose: Nose normal.   Mouth/Throat: Oropharynx is clear and moist.   Eyes: EOM are normal. Pupils are equal, round, and reactive to light.   Neck: Normal range of motion. Neck supple. No thyromegaly present.   Cardiovascular: Normal rate, regular rhythm, normal heart sounds and intact distal pulses.    Pulmonary/Chest: Effort normal and breath sounds normal. No respiratory distress. He has no wheezes. He has no rales.   Abdominal: Soft. He exhibits no distension. There is no tenderness. There is no rebound and no guarding.   Musculoskeletal: Normal range of motion. He exhibits no edema or tenderness.   Neurological: He is alert and oriented to person, place, and time. He has normal reflexes. No cranial nerve deficit.   Skin: Skin is warm and dry. No rash noted.   Psychiatric: He has a normal mood and affect. His behavior is normal. Judgment and thought content normal. note and vitals reviewed.      Procedures        ED Course  ED Course   Value Comment By Time   SCANNED EKG (Reviewed) Brett Santos MD 01/20 1813   SCANNED EKG (Reviewed) Brett Santos MD 01/20 1813    Admit patient to Dr. Rebecca Santos MD 01/20 1911    Patient's troponin came back elevated at 5.6.  Dr. Fernandez was notified we started the patient on heparin drip and Plavix.  We'll send the patient to the unit Brett Santos MD 01/20 1958                  MDM  Number of Diagnoses or Management Options  Non-STEMI (non-ST elevated myocardial infarction): new and requires workup      Amount and/or Complexity of Data Reviewed  Clinical lab tests: reviewed and ordered  Tests in the radiology section of CPT®:  ordered and reviewed  Tests in the medicine section of CPT®:  ordered and reviewed  Discuss the patient with other providers: yes    Risk of Complications, Morbidity, and/or Mortality  Presenting problems: moderate  Diagnostic procedures: moderate  Management options: moderate    Patient Progress  Patient progress: stable      Final diagnoses:   Non-STEMI (non-ST elevated myocardial infarction)           Brett Santos MD  01/20/17 1929      Brett Santos MD  01/20/17 2000       Electronically signed by Brett Santos MD at 1/20/2017  8:00 PM          Vital Signs (last 72 hrs)       01/20 0700  -  01/21 0659 01/21 0700  -  01/22 0659 01/22 0700 - 01/23 0659 01/23 0700 - 01/23 1048   Most Recent    Temp (°F) 97.7 -  98.7    97.5 -  97.7    97.7 -  98.2    97.6 -  98     97.6 (36.4)    Heart Rate (!)48 -  66    52 -  72    50 -  77    (!)47 -  76     (!) 47    Resp 18 -  20    16 -  18    14 -  18    12 -  16     14    BP 94/61 -  (!) 142/105    90/65 -  165/85    95/64 -  128/88    106/70 -  134/88     116/76    SpO2 (%) (!)85 -  100    (!)80 -  99    91 -  97    92 -  100     97          Intake & Output (last 3 days)       01/20 0701 - 01/21 0700 01/21 0701 - 01/22 0700 01/22 0701 - 01/23 0700 01/23 0701 - 01/24 0700    P.O.   1200     I.V. (mL/kg) 230 (2.5) 390.7 (4.3) 246.6 (2.7) 105 (1.2)    Total Intake(mL/kg) 230 (2.5) 390.7 (4.3) 1446.6 (15.9) 105 (1.2)    Urine (mL/kg/hr) 425 850 (0.4) 1425 (0.7)     Total Output       Net -195 -459.3 +21.6 +105            Unmeasured Urine Occurrence 1 x 4 x          Hospital Medications (all)       Dose Frequency Start End    aspirin chewable tablet 324 mg 324 mg Once 1/20/2017 1/20/2017    Sig - Route: Chew 4 tablets 1 (One) Time. - Oral    aspirin EC tablet 325 mg 325 mg Daily 1/21/2017     Sig - Route: Take 1 tablet by mouth  "Daily. - Oral    carvedilol (COREG) tablet 6.25 mg 6.25 mg 2 Times Daily With Meals 1/23/2017     Sig - Route: Take 1 tablet by mouth 2 (Two) Times a Day With Meals. - Oral    cetirizine (zyrTEC) tablet 10 mg 10 mg Daily 1/21/2017     Sig - Route: Take 1 tablet by mouth Daily. - Oral    clopidogrel (PLAVIX) tablet 600 mg 600 mg Once 1/20/2017 1/20/2017    Sig - Route: Take 8 tablets by mouth 1 (One) Time. - Oral    clopidogrel (PLAVIX) tablet 75 mg 75 mg Daily 1/21/2017     Sig - Route: Take 1 tablet by mouth Daily. - Oral    diazePAM (VALIUM) tablet 5 mg 5 mg Once 1/23/2017 1/23/2017    Sig - Route: Take 1 tablet by mouth 1 (One) Time. - Oral    eptifibatide (INTEGRILIN) 75 MG/100ML infusion 2 mcg/kg/min × 88.5 kg Continuous 1/20/2017 1/21/2017    Sig - Route: Infuse 177 mcg/min into a venous catheter Continuous. - Intravenous    Linked Group 1:  \"Followed by\" Linked Group Details        Eptifibatide (INTEGRILIN) injection 15,940 mcg 180 mcg/kg × 88.5 kg Once 1/20/2017 1/20/2017    Sig - Route: Infuse 7.97 mL into a venous catheter 1 (One) Time. - Intravenous    Linked Group 1:  \"Followed by\" Linked Group Details        gabapentin (NEURONTIN) capsule 300 mg 300 mg Every 12 Hours Scheduled 1/20/2017     Sig - Route: Take 1 capsule by mouth Every 12 (Twelve) Hours. - Oral    heparin (porcine) 5000 UNIT/ML injection 5,000 Units 56.5 Units/kg × 88.5 kg Once 1/20/2017 1/20/2017    Sig - Route: Infuse 1 mL into a venous catheter 1 (One) Time. - Intravenous    pantoprazole (PROTONIX) EC tablet 40 mg 40 mg Daily 1/21/2017     Sig - Route: Take 1 tablet by mouth Daily. - Oral    sodium chloride 0.9 % infusion 1-3 mL/kg/hr × 90.7 kg Continuous 1/22/2017     Sig - Route: Infuse 90.7-272.1 mL/hr into a venous catheter Continuous. - Intravenous    sodium chloride 0.9 % infusion 100 mL/hr Continuous 1/23/2017     Sig - Route: Infuse 100 mL/hr into a venous catheter Continuous. - Intravenous    carvedilol (COREG) tablet 12.5 mg " (Discontinued) 12.5 mg 2 Times Daily With Meals 1/20/2017 1/23/2017    Sig - Route: Take 2 tablets by mouth 2 (Two) Times a Day With Meals. - Oral    diphenhydrAMINE (BENADRYL) capsule 25 mg (Discontinued) 25 mg Every 6 Hours PRN 1/22/2017 1/23/2017    Sig - Route: Take 1 capsule by mouth Every 6 (Six) Hours As Needed for itching. - Oral    FentaNYL Citrate (PF) (SUBLIMAZE) injection (Discontinued)  As Needed 1/23/2017 1/23/2017    Sig: As Needed.    Reason for Discontinue: Patient Discharge    heparin (porcine) 5000 UNIT/ML injection 2,500 Units (Discontinued) 28.2 Units/kg × 88.5 kg As Needed 1/20/2017 1/23/2017    Sig - Route: Infuse 0.5 mL into a venous catheter As Needed (bolus dose per heparin nomogram). - Intravenous    heparin (porcine) 5000 UNIT/ML injection 5,000 Units (Discontinued) 56.5 Units/kg × 88.5 kg As Needed 1/20/2017 1/23/2017    Sig - Route: Infuse 1 mL into a venous catheter As Needed (bolus dose per heparin nomogram). - Intravenous    heparin (porcine) injection (Discontinued)  As Needed 1/23/2017 1/23/2017    Sig: As Needed.    Reason for Discontinue: Patient Discharge    heparin infusion 92346 units in 250 mL 0.45 % NaCl (Discontinued) 1,000 Units/hr Titrated 1/20/2017 1/23/2017    Sig - Route: Infuse 1,000 Units/hr into a venous catheter Dose Adjusted By Provider As Needed. - Intravenous    iopamidol (ISOVUE-370) 76 % injection (Discontinued)  As Needed 1/23/2017 1/23/2017    Sig: As Needed.    Reason for Discontinue: Patient Discharge    lidocaine (XYLOCAINE) 2% injection (Discontinued)  As Needed 1/23/2017 1/23/2017    Sig: As Needed.    Reason for Discontinue: Patient Discharge    meloxicam (MOBIC) tablet 15 mg (Discontinued) 15 mg Daily PRN 1/20/2017 1/20/2017    Sig - Route: Take 2 tablets by mouth Daily As Needed for moderate pain (4-6). - Oral    midazolam (VERSED) injection (Discontinued)  As Needed 1/23/2017 1/23/2017    Sig: As Needed.    Reason for Discontinue: Patient Discharge     nitroglycerin (TRIDIL) injection (Discontinued)  As Needed 1/23/2017 1/23/2017    Sig: As Needed.    Reason for Discontinue: Patient Discharge    nitroglycerin 100 mcg/mL 30 mL syringe (Discontinued)  As Needed 1/23/2017 1/23/2017    Sig: As Needed.    Reason for Discontinue: Patient Discharge    O2 (OXYGEN) (Discontinued)  As Needed 1/23/2017 1/23/2017    Sig: As Needed.    Reason for Discontinue: Patient Discharge    phenylephrine (ODESSA-SYNEPHRINE) injection solution (Discontinued)  As Needed 1/23/2017 1/23/2017    Sig: As Needed.    Reason for Discontinue: Patient Discharge    sodium chloride 0.9 % infusion (Discontinued)  Continuous PRN 1/23/2017 1/23/2017    Sig: Continuous As Needed.    Reason for Discontinue: Patient Discharge          Lab Results (all)     Procedure Component Value Units Date/Time    CBC & Differential [[id number]] Collected:  01/20/17 1845    Specimen:  Blood Updated:  01/20/17 1856    Narrative:       The following orders were created for panel order CBC & Differential.  Procedure                               Abnormality         Status                     ---------                               -----------         ------                     CBC Auto Differential[[id number]]         Abnormal            Final result                 Please view results for these tests on the individual orders.    CBC Auto Differential [[id number]]  (Abnormal) Collected:  01/20/17 1845    Specimen:  Blood from Arm, Left Updated:  01/20/17 1856     WBC 6.21 10*3/mm3      RBC 4.50 (L) 10*6/mm3      Hemoglobin 14.0 g/dL      Hematocrit 44.0 %      MCV 97.8 (H) fL      MCH 31.1 pg      MCHC 31.8 (L) g/dL      RDW 14.1 %      RDW-SD 48.7 fl      MPV 10.6 (H) fL      Platelets 184 10*3/mm3      Neutrophil % 49.6 %      Lymphocyte % 34.6 %      Monocyte % 9.8 %      Eosinophil % 5.5 (H) %      Basophil % 0.3 %      Immature Grans % 0.2 %      Neutrophils, Absolute 3.08 10*3/mm3      Lymphocytes, Absolute 2.15 10*3/mm3       Monocytes, Absolute 0.61 10*3/mm3      Eosinophils, Absolute 0.34 10*3/mm3      Basophils, Absolute 0.02 10*3/mm3      Immature Grans, Absolute 0.01 10*3/mm3     Protime-INR [[id number]]  (Normal) Collected:  01/20/17 1845    Specimen:  Blood from Arm, Left Updated:  01/20/17 1905     Protime 10.5 Seconds      INR 0.93     Narrative:       Patients not on anticoagulant therapy:    INR 0.90-1.10     Suggested INR therapeutic range for stable oral anticoagulant therapy:             Routine therapy                      2.00-3.00           Recurrent MI                         2.50-3.50           Mechanical prosthetic valve          2.50-3.50    aPTT [[id number]]  (Normal) Collected:  01/20/17 1845    Specimen:  Blood from Arm, Left Updated:  01/20/17 1905     PTT 24.4 seconds     Narrative:       Heparin protocol:    Therapeutic range 56-80 seconds    Urinalysis With / Culture If Indicated [[id number]]  (Abnormal) Collected:  01/20/17 1906    Specimen:  Urine from Urine, Clean Catch Updated:  01/20/17 1911     Color, UA Yellow      Appearance, UA Clear      pH, UA 5.5      Specific Gravity, UA >1.030 (H)      Glucose, UA Negative      Ketones, UA Negative      Bilirubin, UA Negative      Blood, UA Negative      Protein, UA Negative      Leuk Esterase, UA Negative      Nitrite, UA Negative      Urobilinogen, UA 1.0 E.U./dL     Narrative:       Urine microscopic not indicated.    Comprehensive Metabolic Panel [[id number]]  (Abnormal) Collected:  01/20/17 1845    Specimen:  Blood from Arm, Left Updated:  01/20/17 1922     Glucose 87 mg/dL      BUN 12 mg/dL      Creatinine 0.97 mg/dL      Sodium 145 mmol/L      Potassium 4.0 mmol/L      Chloride 108 mmol/L      CO2 30.2 mmol/L      Calcium 9.3 mg/dL      Total Protein 7.3 g/dL      Albumin 4.00 g/dL      ALT (SGPT) 27 U/L      AST (SGOT) 61 (H) U/L      Alkaline Phosphatase 64 U/L      Total Bilirubin 0.4 mg/dL      eGFR Non African Amer 78 mL/min/1.73      Globulin 3.3 gm/dL       A/G Ratio 1.2 (L) g/dL      BUN/Creatinine Ratio 12.4      Anion Gap 6.8 mmol/L     Lipase [[id number]]  (Normal) Collected:  01/20/17 1845    Specimen:  Blood from Arm, Left Updated:  01/20/17 1922     Lipase 39 U/L     Osmolality, Calculated [[id number]]  (Normal) Collected:  01/20/17 1845    Specimen:  Blood from Arm, Left Updated:  01/20/17 1922     Osmolality Calc 287.8 mOsm/kg     BNP [[id number]]  (Abnormal) Collected:  01/20/17 1845    Specimen:  Blood from Arm, Left Updated:  01/20/17 1928     .0 (H) pg/mL     Troponin [[id number]]  (Abnormal) Collected:  01/20/17 1845    Specimen:  Blood from Arm, Left Updated:  01/20/17 1939     Troponin I 5.638 (C) ng/mL     Narrative:       Ultra Troponin I Reference Range:         <=0.039 ng/mL: Negative    0.04-0.779 ng/mL: Indeterminate Range. Suspicious of MI.  Clinical correlation required.       >=0.78  ng/mL: Consistent with myocardial injury.  Clinical correlation required.    Lipid Panel [[id number]]  (Abnormal) Collected:  01/20/17 1845    Specimen:  Blood from Arm, Left Updated:  01/20/17 1950     Total Cholesterol 198 mg/dL      Triglycerides 202 (H) mg/dL      HDL Cholesterol 35 (L) mg/dL      LDL Cholesterol  123 (H) mg/dL      VLDL Cholesterol 40.4 mg/dL      LDL/HDL Ratio 3.50     Narrative:       Cholesterol Reference Ranges:   Desirable       < 200 mg/dL   Borderline    200-239 mg/dL   High Risk       > 239 mg/dL    Triglyceride Reference Ranges:   Normal          < 150 mg/dL   Borderline    150-199 mg/dL   High          200-499 mg/dL   Very High       > 499 mg/dL    HDL Reference Ranges:   Low              < 40 mg/dL   High             > 59 mg/dL    LDL Reference Ranges:   Optimal         < 100 mg/dL   Near Optimal  100-129 mg/dL   Borderline    130-159 mg/dL   High          160-189 mg/dL   Very High       > 189 mg/dL    Protime-INR [[id number]]  (Normal) Collected:  01/20/17 2001    Specimen:  Blood Updated:  01/20/17 2020     Protime 10.6 Seconds       INR 0.94     Narrative:       Patients not on anticoagulant therapy:    INR 0.90-1.10     Suggested INR therapeutic range for stable oral anticoagulant therapy:             Routine therapy                      2.00-3.00           Recurrent MI                         2.50-3.50           Mechanical prosthetic valve          2.50-3.50    aPTT [[id number]]  (Normal) Collected:  01/20/17 2001    Specimen:  Blood Updated:  01/20/17 2020     PTT 25.0 seconds     Narrative:       Heparin protocol:    Therapeutic range 56-80 seconds    CBC (No Diff) [[id number]]  (Abnormal) Collected:  01/20/17 2055    Specimen:  Blood Updated:  01/20/17 2102     WBC 8.08 10*3/mm3      RBC 4.44 (L) 10*6/mm3      Hemoglobin 14.1 g/dL      Hematocrit 43.4 %      MCV 97.7 (H) fL      MCH 31.8 pg      MCHC 32.5 (L) g/dL      RDW 14.2 %      RDW-SD 48.9 fl      MPV 10.9 (H) fL      Platelets 185 10*3/mm3     CK [[id number]]  (Abnormal) Collected:  01/20/17 2055    Specimen:  Blood Updated:  01/20/17 2144     Creatine Kinase 665 (C) U/L     CK-MB [[id number]]  (Abnormal) Collected:  01/20/17 2055    Specimen:  Blood Updated:  01/20/17 2144     CKMB 61.03 (C) ng/mL     Troponin [[id number]]  (Abnormal) Collected:  01/20/17 2055    Specimen:  Blood Updated:  01/20/17 2144     Troponin I 7.728 (C) ng/mL     Narrative:       Ultra Troponin I Reference Range:         <=0.039 ng/mL: Negative    0.04-0.779 ng/mL: Indeterminate Range. Suspicious of MI.  Clinical correlation required.       >=0.78  ng/mL: Consistent with myocardial injury.  Clinical correlation required.    CK-MB Index [[id number]]  (Abnormal) Collected:  01/20/17 2055    Specimen:  Blood Updated:  01/20/17 2144     CK-MB Index 9.2 (H) %     CBC (No Diff) [[id number]]  (Abnormal) Collected:  01/21/17 0145    Specimen:  Blood Updated:  01/21/17 0157     WBC 7.46 10*3/mm3      RBC 4.43 (L) 10*6/mm3      Hemoglobin 13.6 (L) g/dL      Hematocrit 43.0 %      MCV 97.1 (H) fL      MCH 30.7 pg      MCHC 31.6  (L) g/dL      RDW 14.2 %      RDW-SD 48.7 fl      MPV 10.5 (H) fL      Platelets 176 10*3/mm3     aPTT [[id number]]  (Abnormal) Collected:  01/21/17 0145    Specimen:  Blood Updated:  01/21/17 0216     PTT 42.6 (H) seconds     Narrative:       Heparin protocol:    Therapeutic range 56-80 seconds    Basic Metabolic Panel [[id number]]  (Normal) Collected:  01/21/17 0145    Specimen:  Blood Updated:  01/21/17 0225     Glucose 105 mg/dL      BUN 12 mg/dL      Creatinine 0.86 mg/dL      Sodium 142 mmol/L      Potassium 3.5 mmol/L      Chloride 108 mmol/L      CO2 29.0 mmol/L      Calcium 8.7 mg/dL      eGFR Non African Amer 90 mL/min/1.73      BUN/Creatinine Ratio 14.0      Anion Gap 5.0 mmol/L     Narrative:       GFR Normal >60  Chronic Kidney Disease <60  Kidney Failure <15    Osmolality, Calculated [[id number]]  (Normal) Collected:  01/21/17 0145    Specimen:  Blood Updated:  01/21/17 0225     Osmolality Calc 283.2 mOsm/kg     Myoglobin, Serum [[id number]]  (Normal) Collected:  01/21/17 0145    Specimen:  Blood Updated:  01/21/17 0234     Myoglobin 60.0 ng/mL     Troponin [[id number]]  (Abnormal) Collected:  01/21/17 0145    Specimen:  Blood Updated:  01/21/17 0237     Troponin I 8.062 (C) ng/mL     Narrative:       Ultra Troponin I Reference Range:         <=0.039 ng/mL: Negative    0.04-0.779 ng/mL: Indeterminate Range. Suspicious of MI.  Clinical correlation required.       >=0.78  ng/mL: Consistent with myocardial injury.  Clinical correlation required.    CK-MB [[id number]]  (Abnormal) Collected:  01/21/17 0145    Specimen:  Blood Updated:  01/21/17 0237     CKMB 54.29 (C) ng/mL     CK [[id number]]  (Abnormal) Collected:  01/21/17 0145    Specimen:  Blood Updated:  01/21/17 0237     Creatine Kinase 586 (C) U/L     CK-MB Index [[id number]]  (Abnormal) Collected:  01/21/17 0145    Specimen:  Blood Updated:  01/21/17 0237     CK-MB Index 9.3 (H) %     Troponin [[id number]]  (Abnormal) Collected:  01/21/17 0815    Specimen:  Blood  Updated:  01/21/17 0926     Troponin I 7.116 (C) ng/mL     Narrative:       Ultra Troponin I Reference Range:         <=0.039 ng/mL: Negative    0.04-0.779 ng/mL: Indeterminate Range. Suspicious of MI.  Clinical correlation required.       >=0.78  ng/mL: Consistent with myocardial injury.  Clinical correlation required.    CK [[id number]]  (Abnormal) Collected:  01/21/17 0815    Specimen:  Blood Updated:  01/21/17 0927     Creatine Kinase 545 (C) U/L     CK-MB [[id number]]  (Abnormal) Collected:  01/21/17 0815    Specimen:  Blood Updated:  01/21/17 0927     CKMB 44.07 (C) ng/mL     CK-MB Index [[id number]]  (Abnormal) Collected:  01/21/17 0815    Specimen:  Blood Updated:  01/21/17 0927     CK-MB Index 8.1 (H) %     aPTT [[id number]]  (Abnormal) Collected:  01/21/17 0815    Specimen:  Blood Updated:  01/21/17 0941     PTT 66.0 (H) seconds     Narrative:       Heparin protocol:    Therapeutic range 56-80 seconds    aPTT [[id number]]  (Abnormal) Collected:  01/21/17 1346    Specimen:  Blood Updated:  01/21/17 1426     PTT 64.9 (H) seconds     Narrative:       Heparin protocol:    Therapeutic range 56-80 seconds    Troponin [[id number]]  (Abnormal) Collected:  01/21/17 1346    Specimen:  Blood Updated:  01/21/17 1439     Troponin I 5.963 (C) ng/mL     Narrative:       Ultra Troponin I Reference Range:         <=0.039 ng/mL: Negative    0.04-0.779 ng/mL: Indeterminate Range. Suspicious of MI.  Clinical correlation required.       >=0.78  ng/mL: Consistent with myocardial injury.  Clinical correlation required.    CBC (No Diff) [[id number]]  (Abnormal) Collected:  01/22/17 0042    Specimen:  Blood Updated:  01/22/17 0058     WBC 7.80 10*3/mm3      RBC 4.37 (L) 10*6/mm3      Hemoglobin 13.7 (L) g/dL      Hematocrit 42.7 %      MCV 97.7 (H) fL      MCH 31.4 pg      MCHC 32.1 (L) g/dL      RDW 14.2 %      RDW-SD 49.6 fl      MPV 10.8 (H) fL      Platelets 164 10*3/mm3     aPTT [[id number]]  (Abnormal) Collected:  01/22/17 0042    Specimen:   Blood Updated:  01/22/17 0110     PTT 67.5 (H) seconds     Narrative:       Heparin protocol:    Therapeutic range 56-80 seconds    CBC (No Diff) [[id number]]  (Abnormal) Collected:  01/23/17 0044    Specimen:  Blood Updated:  01/23/17 0126     WBC 7.27 10*3/mm3      RBC 4.42 (L) 10*6/mm3      Hemoglobin 13.7 (L) g/dL      Hematocrit 43.4 %      MCV 98.2 (H) fL      MCH 31.0 pg      MCHC 31.6 (L) g/dL      RDW 14.3 %      RDW-SD 49.7 fl      MPV 10.7 (H) fL      Platelets 175 10*3/mm3     aPTT [[id number]]  (Abnormal) Collected:  01/23/17 0044    Specimen:  Blood Updated:  01/23/17 0134     PTT 61.6 (H) seconds     Narrative:       Heparin protocol:    Therapeutic range 56-80 seconds    POC Activated Clotting Time [[id number]]  (Abnormal) Collected:  01/23/17 0837    Specimen:  Blood Updated:  01/23/17 0936     Activated Clotting Time  167 (H) Seconds       Serial Number: [id number]    : [id number]       POC Activated Clotting Time [[id number]]  (Abnormal) Collected:  01/23/17 0855    Specimen:  Blood Updated:  01/23/17 0936     Activated Clotting Time  322 (H) Seconds       Serial Number: [id number]    : [id number]       POC Activated Clotting Time [[id number]]  (Abnormal) Collected:  01/23/17 0918    Specimen:  Blood Updated:  01/23/17 0936     Activated Clotting Time  188 (H) Seconds       Serial Number: [id number]    : [id number]             Imaging Results (all)     Procedure Component Value Units Date/Time    XR Chest 1 View [[id number]] Collected:  01/21/17 0935     Updated:  01/21/17 0938    Narrative:       XR CHEST 1 VW-     REASON FOR EXAM:  Chest pain; R07.89-Other chest pain     The chest is compared with earlier chest done 1/9/2014     FINDINGS: The cardiac silhouette and mediastinum are normal in size and  configuration. The lungs are both well-aerated and clear. There are no  pleural effusions in the lung bases. No consolidated areas of pneumonia  are identified. The bones and soft tissues are unremarkable.        Impression:        IMPRESSION: Stable chest. A source for the patient's chest pain was  demonstrated.     This report was finalized on 1/21/2017 9:36 AM by Dr. Mark Rivera II, MD.             Orders (all)     Start     Ordered    01/24/17 0700  ECG 12 Lead  Once      01/23/17 0931    01/24/17 0600  CBC (No Diff)  Morning Draw      01/23/17 0931    01/24/17 0600  Basic Metabolic Panel  Morning Draw      01/23/17 0931    01/23/17 1800  carvedilol (COREG) tablet 6.25 mg  2 Times Daily With Meals      01/23/17 0931    01/23/17 1200  Strict intake and output  Every 4 Hours      01/23/17 0931    01/23/17 1015  sodium chloride 0.9 % infusion  Continuous      01/23/17 0931    01/23/17 1000  Incentive Spirometry  Every 2 Hours While Awake      01/23/17 0931    01/23/17 0937  POC Activated Clotting Time  Once      01/23/17 0936    01/23/17 0937  POC Activated Clotting Time  Once      01/23/17 0936    01/23/17 0936  POC Activated Clotting Time  Once      01/23/17 0935    01/23/17 0930  Full Code  Continuous      01/23/17 0931    01/23/17 0930  Obtain STAT EKG during chest pain. Notify MD of any change in rhythm, ST segments or complaints of chest pain.  Until Discontinued      01/23/17 0931    01/23/17 0930  Encourage fluids  Until Discontinued      01/23/17 0931    01/23/17 0930  If patient on Metformin (Glucophage) hold for 48 hours.  Until Discontinued      01/23/17 0931    01/23/17 0930  Notify MD if platelet count is less than 100,000, is less than 1/2 baseline, or if Hgb drops by more than 3mg/dl.  Until Discontinued      01/23/17 0931    01/23/17 0930  Notify MD of hypotension (SBP less than 95), bleeding, or dysrythmia and follow Sheath Removal Policy if needed.  Until Discontinued      01/23/17 0931    01/23/17 0930  Oxygen Therapy- Nasal Cannula; Titrate for spO2: equal to or greater than, 92%, per policy  Continuous      01/23/17 0931    01/23/17 0930  Continuous Pulse Oximetry  Continuous      01/23/17 0930     01/23/17 0930  Diet Regular  Diet Effective Now      01/23/17 0931 01/23/17 0930  Advance diet as tolerated  Until Discontinued      01/23/17 0931 01/23/17 0930  ECG 12 Lead  STAT      01/23/17 0931 01/23/17 0930  Cardiac rehab evaluation  Once     Provider:  (Not yet assigned)    01/23/17 0931 01/23/17 0930  Assess Puncture Site, Vital SIgns, Distal Pulses & Observe Site for Bleeding or Swelling  Per Hospital Policy     Comments:  Every 15 Minutes x4, Every 30 Minutes x4, & Every 1 Hour x2    01/23/17 0931 01/23/17 0930  Remove Femoral / Brachial Sheaths  Once     Comments:  Check ACT every hour until less than 160 then pull sheath by manual compression for 30 minutes    01/23/17 0931 01/23/17 0930  Post Sheath Removal - Keep Flat in Bed With Affected Leg Straight  Until Discontinued     Comments:  Post sheath removal.    01/23/17 0931 01/23/17 0917  iopamidol (ISOVUE-370) 76 % injection  As Needed,   Status:  Discontinued      01/23/17 0917 01/23/17 0905  nitroglycerin 100 mcg/mL 30 mL syringe  As Needed,   Status:  Discontinued      01/23/17 0907    01/23/17 0852  nitroglycerin (TRIDIL) injection  As Needed,   Status:  Discontinued      01/23/17 0852    01/23/17 0852  phenylephrine (ODESSA-SYNEPHRINE) injection solution  As Needed,   Status:  Discontinued      01/23/17 0853    01/23/17 0842  heparin (porcine) injection  As Needed,   Status:  Discontinued      01/23/17 0843    01/23/17 0834  FentaNYL Citrate (PF) (SUBLIMAZE) injection  As Needed,   Status:  Discontinued      01/23/17 0834    01/23/17 0833  lidocaine (XYLOCAINE) 2% injection  As Needed,   Status:  Discontinued      01/23/17 0834    01/23/17 0833  midazolam (VERSED) injection  As Needed,   Status:  Discontinued      01/23/17 0834    01/23/17 0820  sodium chloride 0.9 % infusion  Continuous PRN,   Status:  Discontinued      01/23/17 0824    01/23/17 0820  O2 (OXYGEN)  As Needed,   Status:  Discontinued      01/23/17 0825     01/23/17 0739  Cardiac Catheterization/Vascular Study  Once      01/23/17 0738    01/23/17 0600  diazePAM (VALIUM) tablet 5 mg  Once      01/22/17 1431    01/23/17 0001  NPO Diet  Diet Effective Midnight,   Status:  Canceled      01/22/17 0902    01/23/17 0000  aPTT  Once      01/22/17 0137    01/22/17 1515  sodium chloride 0.9 % infusion  Continuous      01/22/17 1431    01/22/17 1432  Obtain informed consent  Once      01/22/17 1431    01/22/17 1432  Clip bilateral groin.  Once,   Status:  Canceled      01/22/17 1431    01/22/17 0902  Nursing Communication Please give aspirin, Plavix, Benadryl, and Valium on call to catheter lab tomorrow morning  Continuous,   Status:  Canceled     Comments:  Please give aspirin, Plavix, Benadryl, and Valium on call to catheter lab tomorrow morning    01/22/17 0902    01/22/17 0901  diphenhydrAMINE (BENADRYL) capsule 25 mg  Every 6 Hours PRN,   Status:  Discontinued      01/22/17 0902    01/22/17 0900  Case Request Cath Lab: Left Heart Cath  Once      01/22/17 0901    01/22/17 0001  aPTT  Once      01/21/17 1511    01/21/17 1600  aPTT  Once,   Status:  Canceled      01/21/17 0953    01/21/17 1400  aPTT  Once      01/21/17 1049    01/21/17 0939  Diet Regular; Consistent Carbohydrate  Diet Effective Now,   Status:  Canceled      01/21/17 0938    01/21/17 0930  Advance Diet As Tolerated  Until Discontinued      01/21/17 0929    01/21/17 0921  CK-MB Index  Once      01/21/17 0920    01/21/17 0900  aspirin EC tablet 325 mg  Daily      01/20/17 2039 01/21/17 0900  cetirizine (zyrTEC) tablet 10 mg  Daily      01/20/17 2039 01/21/17 0900  pantoprazole (PROTONIX) EC tablet 40 mg  Daily      01/20/17 2039 01/21/17 0900  clopidogrel (PLAVIX) tablet 75 mg  Daily      01/21/17 0810    01/21/17 0850  aPTT  Once,   Status:  Canceled      01/21/17 0849    01/21/17 0830  ECG 12 Lead  Once      01/21/17 0830    01/21/17 0827  CK  STAT      01/21/17 0826    01/21/17 0827  CK-MB  STAT       "01/21/17 0826    01/21/17 0827  Troponin  STAT,   Status:  Canceled      01/21/17 0826    01/21/17 0800  aPTT  Once      01/21/17 0447    01/21/17 0600  CBC (No Diff)  Daily     Comments:  Every morning, during the infusion    01/20/17 2040 01/21/17 0600  Basic Metabolic Panel  Morning Draw      01/20/17 2246    01/21/17 0235  CK-MB Index  Once      01/21/17 0234    01/21/17 0226  Osmolality, Calculated  Once      01/21/17 0225    01/21/17 0200  aPTT  Once      01/21/17 0058    01/21/17 0200  Troponin  Now Then Every 6 Hours      01/21/17 0141    01/21/17 0200  CK-MB  STAT      01/21/17 0141    01/21/17 0200  CK  STAT      01/21/17 0141    01/21/17 0200  Myoglobin, Serum  STAT      01/21/17 0141    01/21/17 0001  NPO Diet  Diet Effective Midnight,   Status:  Canceled      01/21/17 0114    01/21/17 0001  aPTT  Once,   Status:  Canceled      01/21/17 1450    01/20/17 2141  CK-MB Index  Once      01/20/17 2140 01/20/17 2130  carvedilol (COREG) tablet 12.5 mg  2 Times Daily With Meals,   Status:  Discontinued      01/20/17 2039 01/20/17 2130  gabapentin (NEURONTIN) capsule 300 mg  Every 12 Hours Scheduled      01/20/17 2039 01/20/17 2115  Eptifibatide (INTEGRILIN) injection 15,940 mcg  Once      01/20/17 2040 01/20/17 2115  eptifibatide (INTEGRILIN) 75 MG/100ML infusion  Continuous      01/20/17 2040 01/20/17 2043  CK  STAT      01/20/17 2042 01/20/17 2043  CK-MB  STAT      01/20/17 2042 01/20/17 2043  Troponin  STAT      01/20/17 2042 01/20/17 2042  Adult Transthoracic Echo Complete  Once      01/20/17 2041 01/20/17 2041  Assess for risk. If contraindication is identified, do not start the infusion and contact physician immediately for further orders  Once,   Status:  Canceled     Comments:  Contraindications:  \" Hypersensitivity to any component of the product  \" Active abnormal bleeding within previous 30 days or history of bleeding diathesis  \" History of intracranial hemorrhage, " "intracranial neoplasm, arteriovenous malformation or aneurysm  \" History of thrombocytopenia following prior exposure to eptifibatide (Integrilin)  \" History of stroke within 30 days or any history of hemorrhagic stroke  \" Major surgical procedure or severe physical trauma within previous month  \" History of symptoms or findings suggestive of aortic dissection  \" Severe hypertension (SBP greater than 200mmHg or DBP greater than 110mmHg)  \" Concomitant or planned use of another parenteral GP IIb/IIIa inhibitor  \" Acute pericarditis  \" Dependence on renal dialysis  \" Platelet count less than 100,000/mm3 (or reported less than 100)  \" Serum creatinine greater than 4 mcg/dL      01/20/17 2040 01/20/17 2041  Monitor for signs and symptoms of bleeding, especially at arterial access site  Continuous,   Status:  Canceled      01/20/17 2040 01/20/17 2041  Do not discontinue infusion without physician order unless uncontrolled bleeding occurs  Once,   Status:  Canceled      01/20/17 2040 01/20/17 2041  Call physician for all abnormal labs prior to and during infusion  Until Discontinued,   Status:  Canceled      01/20/17 2040 01/20/17 2041  CBC (No Diff)  Once     Comments:  prior to infusion    01/20/17 2040 01/20/17 2041  Nurse to order CBC with no diff 6 hours after initiation of infusion  Once,   Status:  Canceled      01/20/17 2040 01/20/17 2039  meloxicam (MOBIC) tablet 15 mg  Daily PRN,   Status:  Discontinued      01/20/17 2039 01/20/17 2030  clopidogrel (PLAVIX) tablet 600 mg  Once      01/20/17 1956 01/20/17 1958  aspirin chewable tablet 324 mg  Once      01/20/17 1956 01/20/17 1957  heparin (porcine) 5000 UNIT/ML injection 5,000 Units  Once      01/20/17 1955 01/20/17 1957  heparin infusion 10961 units in 250 mL 0.45 % NaCl  Titrated,   Status:  Discontinued      01/20/17 1955 01/20/17 1955  Heparin nomogram/protocol adjustment  Once      01/20/17 1955 01/20/17 1955  Notify " provider for platelet count less than 75K  Until Discontinued,   Status:  Canceled      01/20/17 1955 01/20/17 1955  Notify provider if patient is not in therapeutic range (56 - 80) after 24 hours  Until Discontinued,   Status:  Canceled      01/20/17 1955 01/20/17 1955  If bleeding occurs, stop infusion and call provider immediately  Continuous,   Status:  Canceled      01/20/17 1955 01/20/17 1955  Protime-INR  Once     Comments:  Prior to administering heparin, Do not wait for results to start therapy    01/20/17 1955 01/20/17 1955  aPTT  Once     Comments:  Prior to administering heparin, Do not wait for results to start therapy    01/20/17 1955 01/20/17 1955  aPTT  Once,   Status:  Canceled     Comments:  PTT 6 hours after initiating therapy    01/20/17 1955 01/20/17 1955  Nurse to order - aPTT 6 hours after each infusion change  Until Discontinued,   Status:  Canceled      01/20/17 1955 01/20/17 1955  If no bolus or infusion rate change, order PTT daily.  Until Discontinued,   Status:  Canceled      01/20/17 1955 01/20/17 1955  After two consecutive therapeutic aPTTs (56-80), the aPTT is obtained every morning and the infusion adjusted accordingly. If an adjustment is necessary, then check aPTT 6 hours after the change.  Until Discontinued,   Status:  Canceled      01/20/17 1955 01/1954  heparin (porcine) 5000 UNIT/ML injection 2,500 Units  As Needed,   Status:  Discontinued      01/20/17 1955 01/1954  heparin (porcine) 5000 UNIT/ML injection 5,000 Units  As Needed,   Status:  Discontinued      01/20/17 1955 01/20/17 1952  ICU / CCU Bed Request  Once      01/20/17 1952 01/20/17 1951  Inpatient Admission  Once      01/20/17 1952 01/20/17 1950  Cardiology (on-call MD unless specified)  Once,   Status:  Canceled     Specialty:  Cardiology  Provider:  Larry Fernandez MD    01/20/17 1950 01/20/17 1938  Lipid Panel  Once      01/20/17 1937 01/20/17 1923   Osmolality, Calculated  Once      01/20/17 1922 01/20/17 1913  Tele Bed Request  Once      01/20/17 1912    01/20/17 1913  Inpatient Admission  Once      01/20/17 1913    01/20/17 1912  Cardiology (on-call MD unless specified)  Once     Specialty:  Cardiology  Provider:  Larry Fernandez MD    01/20/17 1912    01/20/17 1813  ECG 12 Lead  Once      01/20/17 1812    01/20/17 1813  XR Chest 1 View  1 Time Imaging      01/20/17 1812    01/20/17 1812  CBC & Differential  Once      01/20/17 1812    01/20/17 1812  Comprehensive Metabolic Panel  Once      01/20/17 1812    01/20/17 1812  Protime-INR  Once      01/20/17 1812    01/20/17 1812  aPTT  Once      01/20/17 1812    01/20/17 1812  BNP  Once      01/20/17 1812    01/20/17 1812  Lipase  Once      01/20/17 1812    01/20/17 1812  Urinalysis With / Culture If Indicated  Once      01/20/17 1812    01/20/17 1812  Troponin  Once      01/20/17 1812    01/20/17 1812  CBC Auto Differential  PROCEDURE ONCE      01/20/17 1812    Unscheduled  Vital Signs  As Needed      01/23/17 0931    Unscheduled  Check distal extremity for warmth, color, sensation and pulses with each vital sign and site check.  As Needed      01/23/17 0931    Unscheduled  Change site dressing  As Needed      01/23/17 0931    Unscheduled  Straight cath  As Needed     Comments:  Place Avila Catheter if unable to void    01/23/17 0931    --  carvedilol (COREG) 12.5 MG tablet  2 Times Daily With Meals      01/20/17 1828    --  pantoprazole (PROTONIX) 40 MG EC tablet  Daily      01/20/17 1828    --  gabapentin (NEURONTIN) 300 MG capsule  2 Times Daily      01/20/17 1828    --  meloxicam (MOBIC) 15 MG tablet  Daily PRN      01/20/17 1828    --  aspirin  MG tablet  Daily      01/20/17 2006    --  cetirizine (zyrTEC) 10 MG tablet  Daily      01/20/17 2006                 Physician Progress Notes (all)      Larry Fernandez MD at 1/21/2017  8:28 AM  Version 1 of 1                 LOS: 1 day   Patient Care  Team:  KISHAN Dumont as PCP - General  KISHAN Dumont as PCP - Family Medicine    Chief Complaint:Keith ann 61 y.o. male presents with known coronary artery disease and a non-ST elevation myocardial infarction       Interval History: He has had no further chest pain      Objective   Vital Signs  Temp:  [97.7 °F (36.5 °C)-98.7 °F (37.1 °C)] 97.7 °F (36.5 °C)  Heart Rate:  [48-66] 52  Resp:  [16-20] 16  BP: ()/() 121/88    Intake/Output Summary (Last 24 hours) at 01/21/17 0828  Last data filed at 01/21/17 0600   Gross per 24 hour   Intake    230 ml   Output    425 ml   Net   -195 ml       Comfortable NAD  PERRL, conjunctiva clear  Neck supple, no JVD or thyromegaly appreciated  S1/S2 RRR, no m/r/g  Lungs CTA B, normal effort  Abdomen S/NT/ND (+) BS, no HSM appreciated  Extremities warm, no clubbing, cyanosis, or edema  Normal gait  No visible or palpable skin lesions  A/Ox4, mood and affect appropriate    Results Review:        Results from last 7 days  Lab Units 01/21/17 0145 01/20/17  1845   SODIUM mmol/L 142 145   POTASSIUM mmol/L 3.5 4.0   CHLORIDE mmol/L 108 108   TOTAL CO2 mmol/L 29.0 30.2   BUN mg/dL 12 12   CREATININE mg/dL 0.86 0.97   GLUCOSE mg/dL 105 87   CALCIUM mg/dL 8.7 9.3       Results from last 7 days  Lab Units 01/21/17 0145 01/20/17 2055 01/20/17  1845   CK TOTAL U/L 586* 665*  --    TROPONIN I ng/mL 8.062* 7.728* 5.638*   CK MB INDEX % 9.3* 9.2*  --        Results from last 7 days  Lab Units 01/21/17 0145 01/20/17 2055 01/20/17  1845   WBC 10*3/mm3 7.46 8.08 6.21   HEMOGLOBIN g/dL 13.6* 14.1 14.0   HEMATOCRIT % 43.0 43.4 44.0   PLATELETS 10*3/mm3 176 185 184       Results from last 7 days  Lab Units 01/21/17  0145 01/20/17  2001 01/20/17  1845   INR   --  0.94 0.93   APTT seconds 42.6* 25.0 24.4       Results from last 7 days  Lab Units 01/20/17  1845   CHOLESTEROL mg/dL 198           Results from last 7 days  Lab Units 01/20/17  1845   CHOLESTEROL mg/dL 198    TRIGLYCERIDES mg/dL 202*   HDL CHOL mg/dL 35*       I reviewed the patient's new clinical results.  I personally viewed and interpreted the patient's EKG/Telemetry data        Medication Review:     aspirin  mg Oral Daily   carvedilol 12.5 mg Oral BID With Meals   cetirizine 10 mg Oral Daily   clopidogrel 75 mg Oral Daily   gabapentin 300 mg Oral Q12H   pantoprazole 40 mg Oral Daily         eptifibatide 2 mcg/kg/min Last Rate: 2 mcg/kg/min (01/21/17 0339)   heparin (porcine) 1,000 Units/hr Last Rate: 16 Units/kg/hr (01/21/17 0336)       Active Problems:    Chest pain of unknown etiology    Non-STEMI (non-ST elevated myocardial infarction)      Assessment&#47;Plan   Non-STEMI  He is currently completely pain free and his enzymes are trending down.  I will continue his Integrilin for another 3 hours and continue him on heparin thereafter.  I anticipate cardiac catheterization on Monday or sooner should he have recurrent chest pain    Larry Fernandez MD  01/21/17  8:28 AM           Electronically signed by Larry Fernandez MD at 1/21/2017  8:29 AM      Larry Fernandez MD at 1/22/2017  8:58 AM  Version 1 of 1                  LOS: 2 days   Patient Care Team:  KISHAN Dumont as PCP - General  KISHAN Dumont as PCP - Family Medicine    Chief Complaint:Keith ann 61 y.o. male presents with a non-ST elevation myocardial infarction.       Interval History: He has had no further chest pain      Objective   Vital Signs  Temp:  [97.5 °F (36.4 °C)-98.2 °F (36.8 °C)] 98.2 °F (36.8 °C)  Heart Rate:  [54-72] 63  Resp:  [16-18] 18  BP: ()/(46-88) 111/78    Intake/Output Summary (Last 24 hours) at 01/22/17 0858  Last data filed at 01/22/17 0559   Gross per 24 hour   Intake 390.71 ml   Output    850 ml   Net -459.29 ml       Comfortable NAD  PERRL, conjunctiva clear  Neck supple, no JVD or thyromegaly appreciated  S1/S2 RRR, no m/r/g  Lungs CTA B, normal effort  Abdomen S/NT/ND (+) BS, no HSM  appreciated  Extremities warm, no clubbing, cyanosis, or edema  Normal gait  No visible or palpable skin lesions  A/Ox4, mood and affect appropriate    Results Review:        Results from last 7 days  Lab Units 01/21/17  0145 01/20/17  1845   SODIUM mmol/L 142 145   POTASSIUM mmol/L 3.5 4.0   CHLORIDE mmol/L 108 108   TOTAL CO2 mmol/L 29.0 30.2   BUN mg/dL 12 12   CREATININE mg/dL 0.86 0.97   GLUCOSE mg/dL 105 87   CALCIUM mg/dL 8.7 9.3       Results from last 7 days  Lab Units 01/21/17  1346 01/21/17  0815 01/21/17 0145 01/20/17 2055   CK TOTAL U/L  --  545* 586* 665*   TROPONIN I ng/mL 5.963* 7.116* 8.062* 7.728*   CK MB INDEX %  --  8.1* 9.3* 9.2*       Results from last 7 days  Lab Units 01/22/17  0042 01/21/17  0145 01/20/17 2055   WBC 10*3/mm3 7.80 7.46 8.08   HEMOGLOBIN g/dL 13.7* 13.6* 14.1   HEMATOCRIT % 42.7 43.0 43.4   PLATELETS 10*3/mm3 164 176 185       Results from last 7 days  Lab Units 01/22/17  0042 01/21/17  1346 01/21/17  0815  01/20/17 2001 01/20/17  1845   INR   --   --   --   --  0.94 0.93   APTT seconds 67.5* 64.9* 66.0*  < > 25.0 24.4   < > = values in this interval not displayed.    Results from last 7 days  Lab Units 01/20/17  1845   CHOLESTEROL mg/dL 198           Results from last 7 days  Lab Units 01/20/17  1845   CHOLESTEROL mg/dL 198   TRIGLYCERIDES mg/dL 202*   HDL CHOL mg/dL 35*       I reviewed the patient's new clinical results.  I personally viewed and interpreted the patient's EKG/Telemetry data        Medication Review:     aspirin  mg Oral Daily   carvedilol 12.5 mg Oral BID With Meals   cetirizine 10 mg Oral Daily   clopidogrel 75 mg Oral Daily   gabapentin 300 mg Oral Q12H   pantoprazole 40 mg Oral Daily         heparin (porcine) 1,000 Units/hr Last Rate: 16 Units/kg/hr (01/21/17 8617)       Active Problems:    Chest pain of unknown etiology    Non-STEMI (non-ST elevated myocardial infarction)      Assessment&#47;Plan   Non-STEMI  We will proceed with cardiac  catheterization in the morning.  The patient has been given informed consent.  He understands the risks and benefits to include the risk of heart attack, stroke and death.  We will proceed tomorrow morning    Larry Fernandez MD  01/22/17  8:58 AM           Electronically signed by Larry Fernandez MD at 1/22/2017  8:59 AM      Larry Fernandez MD at 1/23/2017  9:31 AM  Version 1 of 1                  LOS: 3 days   Patient Care Team:  KISHAN Dumont as PCP - General  KISHAN Dumont as PCP - Family Medicine    Chief Complaint:Keith ann 61 y.o. male presents with a non-ST elevation myocardial infarction.       Interval History: He underwent PCI of the right coronary artery this morning which was uncomplicated      Objective   Vital Signs  Temp:  [97.7 °F (36.5 °C)-98.2 °F (36.8 °C)] 98 °F (36.7 °C)  Heart Rate:  [50-77] 61  Resp:  [12-18] 14  BP: ()/(58-88) 123/79    Intake/Output Summary (Last 24 hours) at 01/23/17 0931  Last data filed at 01/23/17 0923   Gross per 24 hour   Intake 1191.64 ml   Output   1425 ml   Net -233.36 ml       Comfortable NAD  PERRL, conjunctiva clear  Neck supple, no JVD or thyromegaly appreciated  S1/S2 RRR, no m/r/g  Lungs CTA B, normal effort  Abdomen S/NT/ND (+) BS, no HSM appreciated  Extremities warm, no clubbing, cyanosis, or edema  Normal gait  No visible or palpable skin lesions  A/Ox4, mood and affect appropriate    Results Review:        Results from last 7 days  Lab Units 01/21/17  0145 01/20/17  1845   SODIUM mmol/L 142 145   POTASSIUM mmol/L 3.5 4.0   CHLORIDE mmol/L 108 108   TOTAL CO2 mmol/L 29.0 30.2   BUN mg/dL 12 12   CREATININE mg/dL 0.86 0.97   GLUCOSE mg/dL 105 87   CALCIUM mg/dL 8.7 9.3       Results from last 7 days  Lab Units 01/21/17  1346 01/21/17  0815 01/21/17  0145 01/20/17  2055   CK TOTAL U/L  --  545* 586* 665*   TROPONIN I ng/mL 5.963* 7.116* 8.062* 7.728*   CK MB INDEX %  --  8.1* 9.3* 9.2*       Results from last 7 days  Lab  Units 01/23/17  0044 01/22/17  0042 01/21/17  0145   WBC 10*3/mm3 7.27 7.80 7.46   HEMOGLOBIN g/dL 13.7* 13.7* 13.6*   HEMATOCRIT % 43.4 42.7 43.0   PLATELETS 10*3/mm3 175 164 176       Results from last 7 days  Lab Units 01/23/17  0044 01/22/17  0042 01/21/17  1346  01/20/17 2001 01/20/17  1845   INR   --   --   --   --  0.94 0.93   APTT seconds 61.6* 67.5* 64.9*  < > 25.0 24.4   < > = values in this interval not displayed.    Results from last 7 days  Lab Units 01/20/17  1845   CHOLESTEROL mg/dL 198           Results from last 7 days  Lab Units 01/20/17  1845   CHOLESTEROL mg/dL 198   TRIGLYCERIDES mg/dL 202*   HDL CHOL mg/dL 35*       I reviewed the patient's new clinical results.  I personally viewed and interpreted the patient's EKG/Telemetry data        Medication Review:     aspirin  mg Oral Daily   carvedilol 6.25 mg Oral BID With Meals   cetirizine 10 mg Oral Daily   clopidogrel 75 mg Oral Daily   gabapentin 300 mg Oral Q12H   pantoprazole 40 mg Oral Daily         heparin (porcine) 1,000 Units/hr Last Rate: Stopped (01/23/17 0746)   sodium chloride 1-3 mL/kg/hr    sodium chloride  Last Rate: 100 mL/hr (01/23/17 0820)   sodium chloride 100 mL/hr        Active Problems:    Chest pain of unknown etiology    Non-STEMI (non-ST elevated myocardial infarction)      Assessment&#47;Plan   Non-STEMI'll likely be okay for discharge in the morning    Larry Fernandez MD  01/23/17  9:31 AM           Electronically signed by Larry Fernandez MD at 1/23/2017  9:32 AM             [facility] CATHETERIZATION LAB  [address] [place] [id number]  [phone]             Keith Craig   Cardiac Catheterization/Vascular Study   Order# [id number]   Reading physician: Larry Fernandez MD Ordering physician: Larry Fernandez MD Study date: 1/23/17   Patient Information   Name MRN Description   Keith Craig [id number] 61 y.o. Male   Date of Birth Sex Race Ethnicity Encounter Category   Dec 28, 1955  Male White or  Not  or  Emergency   Physicians   Panel Physicians Referring Physician Case Authorizing Physician   Larry Fernandez MD (Primary)  Larry Fernandez MD   Procedures   Left Heart Cath   Percutaneous Coronary Intervention   Indications   Non-STEMI (non-ST elevated myocardial infarction) [I21.4 (ICD-10-CM)]   Pre-procedure Diagnosis   NSTEMI       Conclusion      · Right dominant coronary artery system with an acute occlusion of the right coronary artery  · Successful angioplasty and stenting of the right coronary artery.      Final impression:  Right dominant coronary artery system with single vessel disease involving acute occlusion of the right coronary artery.  Successful angioplasty and stenting of the right coronary artery     Procedures performed:  Coronary angiography  Angioplasty and stenting of the right coronary artery     History of present illness:  Keith is a pleasant gentleman who presents with a history of known coronary artery disease and unstable angina. He did have a non-ST elevation myocardial infarction and as such was referred for cardiac catheterization. Prior to the procedure the patient was given informed consent apprising him of the risk of heart attack, stroke and death. The patient understood the risks and agreed to proceed.     Procedure in detail:  The patient was brought to the cardiac catheterization laboratory and prepped and draped in the usual sterile fashion. Adequate anesthesia was obtained by infiltrating the right groin with local lidocaine. Using a modified center technique a 5 Vincentian sheath was placed in the right femoral artery. A 5 Vincentian JL4 catheter was used to engage the ostium of the left main coronary artery and angiography was performed in varying views using hand injection of contrast material. After this a 5 Vincentian JR4 catheter was used to engage the ostium of the right coronary artery and angiography was again performed in  varying views using hand injection of contrast material. After this the patient was given adequate heparinization to achieve an ACT greater than 200. The 5 [place] Islander sheath was exchanged for a 6 [place] Islander sheath and a 6 [place] Islander JR4 guiding catheter was used to engage the ostium of the right coronary artery. A BMW wire was placed atraumatically in the distal portion of the right coronary artery. The lesion was predilated with a 1.5 mm balloon in order to assure the wire was intraluminal. After this lesion was dilated with a 2.0 mm balloon. There is presence of visible thrombus and as such a mechanical thrombectomy was performed with a Pronto catheter. After this a 3.0 x 28 mm Alpine drug-eluting stent was deployed at 14 jaren. The balloon was reinflated to 14 jaren. The stent was postdilated with a 3.5 mm noncompliant balloon. This was inflated to 12 jaren. After this orthogonal angiography revealed an excellent result without evidence of dissection and PHIL-3 flow in the distal vascular bed. The patient was returned to the floor without evidence of complication.     Findings in detail:  Left Main coronary artery:  The left main coronary artery is a large vessel which bifurcates into the LAD and circumflex arteries. The left main coronary artery is free of atherosclerotic disease.     Left anterior descending artery:  The left anterior descending artery is a large vessel which reaches beyond the apex the ventricle and gives rise to 2 diagonal branches the first of which is small the next of which is small to moderate. There is a previously implanted stent noted in the proximal portion of the LAD this is patent with at most 30% in-stent restenosis.     Circumflex artery:  The circumflex artery is a large vessel which gives rise to 2 obtuse marginal branches the first of which is moderate the next of which is large. There is a 50-60% lesion noted in the proximal portion of the second obtuse marginal branch.     Right coronary  artery:  The right coronary artery is a large vessel which gives rise to the posterior descending artery and multiple posterior lateral branches. Prior to intervention there is a complete 100% occlusion of the midportion of the right coronary artery. This was the culprit lesion. There is PHIL 0 flow the distal vascular bed. Postintervention this is reduced to 0% residual stenosis with PHIL-3 flow in the distal vascular bed. The vessel diameter is 3.5 mm. The lesion length is approximately 25 mm. This is a type B or high risk lesion due to presence of visible thrombus in lesion length.     Final impression and plan:  Overall it is my impression that Heriberto has undergone successful percutaneous revascularization of the right coronary artery. It would be reasonable to consider follow-up stress testing in approximately one month to further assess the lesion in the circumflex artery.

## 2017-01-24 VITALS
DIASTOLIC BLOOD PRESSURE: 75 MMHG | RESPIRATION RATE: 14 BRPM | HEIGHT: 71 IN | HEART RATE: 68 BPM | TEMPERATURE: 97.9 F | SYSTOLIC BLOOD PRESSURE: 108 MMHG | OXYGEN SATURATION: 97 % | WEIGHT: 200 LBS | BODY MASS INDEX: 28 KG/M2

## 2017-01-24 LAB
ANION GAP SERPL CALCULATED.3IONS-SCNC: 15.3 MMOL/L (ref 3.6–11.2)
BUN BLD-MCNC: 13 MG/DL (ref 7–21)
BUN/CREAT SERPL: 14 (ref 7–25)
CALCIUM SPEC-SCNC: 8.6 MG/DL (ref 7.7–10)
CHLORIDE SERPL-SCNC: 107 MMOL/L (ref 99–112)
CO2 SERPL-SCNC: 24.7 MMOL/L (ref 24.3–31.9)
CREAT BLD-MCNC: 0.93 MG/DL (ref 0.43–1.29)
DEPRECATED RDW RBC AUTO: 49.1 FL (ref 37–54)
ERYTHROCYTE [DISTWIDTH] IN BLOOD BY AUTOMATED COUNT: 13.9 % (ref 11.5–14.5)
GFR SERPL CREATININE-BSD FRML MDRD: 82 ML/MIN/1.73
GLUCOSE BLD-MCNC: 102 MG/DL (ref 70–110)
HCT VFR BLD AUTO: 41.4 % (ref 42–52)
HGB BLD-MCNC: 13.2 G/DL (ref 14–18)
MCH RBC QN AUTO: 30.5 PG (ref 27–33)
MCHC RBC AUTO-ENTMCNC: 31.9 G/DL (ref 33–37)
MCV RBC AUTO: 95.6 FL (ref 80–94)
OSMOLALITY SERPL CALC.SUM OF ELEC: 292.7 MOSM/KG (ref 273–305)
PLATELET # BLD AUTO: 135 10*3/MM3 (ref 130–400)
PMV BLD AUTO: 11.3 FL (ref 6–10)
POTASSIUM BLD-SCNC: 4.5 MMOL/L (ref 3.5–5.3)
RBC # BLD AUTO: 4.33 10*6/MM3 (ref 4.7–6.1)
SODIUM BLD-SCNC: 147 MMOL/L (ref 135–153)
WBC NRBC COR # BLD: 6.5 10*3/MM3 (ref 4.5–12.5)

## 2017-01-24 PROCEDURE — 93010 ELECTROCARDIOGRAM REPORT: CPT | Performed by: INTERNAL MEDICINE

## 2017-01-24 PROCEDURE — 99238 HOSP IP/OBS DSCHRG MGMT 30/<: CPT | Performed by: INTERNAL MEDICINE

## 2017-01-24 PROCEDURE — 85027 COMPLETE CBC AUTOMATED: CPT | Performed by: INTERNAL MEDICINE

## 2017-01-24 PROCEDURE — 93005 ELECTROCARDIOGRAM TRACING: CPT | Performed by: INTERNAL MEDICINE

## 2017-01-24 PROCEDURE — 80048 BASIC METABOLIC PNL TOTAL CA: CPT | Performed by: INTERNAL MEDICINE

## 2017-01-24 RX ORDER — CARVEDILOL 3.12 MG/1
3.12 TABLET ORAL 2 TIMES DAILY WITH MEALS
Status: DISCONTINUED | OUTPATIENT
Start: 2017-01-24 | End: 2017-01-24 | Stop reason: HOSPADM

## 2017-01-24 RX ORDER — ATORVASTATIN CALCIUM 40 MG/1
40 TABLET, FILM COATED ORAL DAILY
Status: DISCONTINUED | OUTPATIENT
Start: 2017-01-24 | End: 2017-01-24 | Stop reason: HOSPADM

## 2017-01-24 RX ORDER — CLOPIDOGREL BISULFATE 75 MG/1
75 TABLET ORAL DAILY
Qty: 30 TABLET | Refills: 5
Start: 2017-01-24 | End: 2017-02-22 | Stop reason: SDUPTHER

## 2017-01-24 RX ORDER — LISINOPRIL 2.5 MG/1
2.5 TABLET ORAL DAILY
Qty: 30 TABLET | Refills: 11
Start: 2017-01-24 | End: 2017-02-23 | Stop reason: SDUPTHER

## 2017-01-24 RX ORDER — CARVEDILOL 3.12 MG/1
3.12 TABLET ORAL 2 TIMES DAILY WITH MEALS
Qty: 60 TABLET | Refills: 5
Start: 2017-01-24 | End: 2017-02-22 | Stop reason: SDUPTHER

## 2017-01-24 RX ADMIN — PANTOPRAZOLE SODIUM 40 MG: 40 TABLET, DELAYED RELEASE ORAL at 08:21

## 2017-01-24 RX ADMIN — CETIRIZINE HYDROCHLORIDE 10 MG: 10 TABLET ORAL at 08:21

## 2017-01-24 RX ADMIN — CLOPIDOGREL BISULFATE 75 MG: 75 TABLET, FILM COATED ORAL at 08:21

## 2017-01-24 RX ADMIN — ASPIRIN 325 MG: 325 TABLET, COATED ORAL at 08:22

## 2017-01-24 RX ADMIN — CARVEDILOL 3.12 MG: 3.12 TABLET, FILM COATED ORAL at 08:21

## 2017-01-24 RX ADMIN — HYDROCODONE BITARTRATE AND ACETAMINOPHEN 1 TABLET: 7.5; 325 TABLET ORAL at 02:44

## 2017-01-24 RX ADMIN — GABAPENTIN 300 MG: 300 CAPSULE ORAL at 08:21

## 2017-01-24 RX ADMIN — ATORVASTATIN CALCIUM 40 MG: 40 TABLET, FILM COATED ORAL at 08:22

## 2017-01-24 NOTE — PROGRESS NOTES
Discharge Planning Assessment   Kemar     Patient Name: Keith Craig  MRN: [id number]  Today's Date: 1/24/2017    Admit Date: 1/20/2017          Discharge Needs Assessment     None            Discharge Plan       01/24/17 1031    Final Note    Final Note Discharged to home on this date.        Discharge Placement     No information found        Expected Discharge Date and Time     Expected Discharge Date Expected Discharge Time    Jan 24, 2017  9:45 AM              Demographic Summary     None            Functional Status     None            Psychosocial     None            Abuse/Neglect     None            Legal     None            Substance Abuse     None            Patient Forms     None          Nancy Lubin RN

## 2017-01-24 NOTE — DISCHARGE INSTR - APPOINTMENTS
Dr. Larry Fernandez on Wednesday, February 8, 2017 @ 09:00 am  [address] [place] [address]  [phone]

## 2017-01-24 NOTE — DISCHARGE SUMMARY
Chief complaint:  Chest pain    History of present illness:  Heriberto is a very pleasant gentleman who presents with a history of known coronary artery disease and a non-ST elevation myocardial infarction.    Hospital course:  He underwent a rule out protocol which was notable for peak CK of approximately 600.  Subsequent cardiac catheterization demonstrated a right dominant coronary artery system with an occlusion of the right coronary artery and moderate disease of the circumflex.  He underwent drug-eluting stent implantation to the right coronary artery.  He was observed overnight, did well and is being discharged to home.    Discharge diagnoses:  Coronary artery disease  Non-ST elevation microinfarction  Hyperlipidemia.     Discharge medications:  Coreg 3.125 mg twice a day  Plavix 75 mg daily  Aspirin 325 mg daily  Lisinopril 2.5 mg daily  Lipitor 40 mg daily    Procedures performed  Cardiac catheterization angioplasty and stenting    Discharge instructions  The patient has been counseled regarding the importance of dual antiplatelet therapy  The patient will take his medications as prescribed  The patient will return to the emergency department.  Recurrent chest pain  The patient will follow-up with me in 2 weeks

## 2017-01-24 NOTE — CONSULTS
First visit today time in: [id number] d/w patient and wife.  Wife states will d/w pt., would like pt to participate.    Second time in 1910, time out 1925 pt states he will call in a few weeks to let staff know if       Order received for Cardiac Rehab Consultation.     CR staff will follow up with patient      Information discussed with: Patient/Spouse     Educated on: Benefits of Exercise,  Educated on Cardiac Rehab and Program Protocol, Brochure and/or educational material provided, Contact information given and Teach Back Verified    Comments: Spoke with pt and wife on two different occasions.  Pt would like staff to contact him in a few weeks.  Unsure if he is interested in attending.        Thank you for the referral. Please contact the Cardiac Rehab Dept. (ext. [phone]) with any further questions or concerns.

## 2017-01-25 NOTE — PAYOR COMM NOTE
"CONTACT:  SOPHIE NGUYỄN RN, BSN  UTILIZATION MANAGEMENT DEPT.  [facility]  [address] [facility], [address]  PHONE:  [phone]  FAX: [phone]    PT D/C'D HOME 1/24/17. REFER TO AUTH # [id number]    Keith Craig (61 y.o. Male)     Date of Birth Social Security Number Address Home Phone MRN    1955  [address]  [facility] [address] [phone] [id number]    [place] Marital Status          Catholic        Admission Date Admission Type Admitting Provider Attending Provider Department, Room/Bed    1/20/17 Emergency Larry Fernandez MD  [facility] CRITICAL CARE, 03/    Discharge Date Discharge Disposition Discharge Destination        1/24/2017 Home or Self Care             Attending Provider: (none)    Allergies:  No Known Allergies    Isolation:  None   Infection:  None   Code Status:  Prior    Ht:  71\" (180.3 cm)   Wt:  200 lb (90.7 kg)    Admission Cmt:  None   Principal Problem:  None                Active Insurance as of 1/20/2017     Primary Coverage     Payor Plan Insurance Group Employer/Plan Group    [employer] BLUE CROSS [employer] CROSS BLUE SHIELD PPO [id number]     Payor Plan Address Payor Plan Phone Number Effective From Effective To    [address] [phone] 1/3/2017     [place] [id number]       Subscriber Name Subscriber Birth Date Member ID       BAKARI CRAIG [birth year]15/1957 [id number]                 Emergency Contacts      (Rel.) Home Phone Work Phone Mobile Phone    Bakari Craig (Spouse) [phone] -- --               Discharge Summary      Larry Fernandez MD at 1/24/2017  6:48 AM          Chief complaint:  Chest pain    History of present illness:  Heriberto is a very pleasant gentleman who presents with a history of known coronary artery disease and a non-ST elevation myocardial infarction.    Hospital course:  He underwent a rule out protocol which was notable for peak CK of approximately 600.  Subsequent " cardiac catheterization demonstrated a right dominant coronary artery system with an occlusion of the right coronary artery and moderate disease of the circumflex.  He underwent drug-eluting stent implantation to the right coronary artery.  He was observed overnight, did well and is being discharged to home.    Discharge diagnoses:  Coronary artery disease  Non-ST elevation microinfarction  Hyperlipidemia.     Discharge medications:  Coreg 3.125 mg twice a day  Plavix 75 mg daily  Aspirin 325 mg daily  Lisinopril 2.5 mg daily  Lipitor 40 mg daily    Procedures performed  Cardiac catheterization angioplasty and stenting    Discharge instructions  The patient has been counseled regarding the importance of dual antiplatelet therapy  The patient will take his medications as prescribed  The patient will return to the emergency department.  Recurrent chest pain  The patient will follow-up with me in 2 weeks         Electronically signed by Larry Fernandez MD at 1/24/2017  6:50 AM

## 2017-02-08 ENCOUNTER — OFFICE VISIT (OUTPATIENT)
Dept: CARDIOLOGY | Facility: CLINIC | Age: 62
End: 2017-02-08

## 2017-02-08 VITALS
OXYGEN SATURATION: 95 % | BODY MASS INDEX: 27.96 KG/M2 | HEIGHT: 71 IN | WEIGHT: 199.7 LBS | HEART RATE: 61 BPM | DIASTOLIC BLOOD PRESSURE: 80 MMHG | SYSTOLIC BLOOD PRESSURE: 117 MMHG

## 2017-02-08 DIAGNOSIS — I10 ESSENTIAL HYPERTENSION: ICD-10-CM

## 2017-02-08 DIAGNOSIS — R07.9 CHEST PAIN, UNSPECIFIED TYPE: Primary | ICD-10-CM

## 2017-02-08 DIAGNOSIS — I21.4 NON-STEMI (NON-ST ELEVATED MYOCARDIAL INFARCTION) (HCC): ICD-10-CM

## 2017-02-08 DIAGNOSIS — E78.2 MIXED HYPERLIPIDEMIA: ICD-10-CM

## 2017-02-08 PROCEDURE — 93000 ELECTROCARDIOGRAM COMPLETE: CPT | Performed by: INTERNAL MEDICINE

## 2017-02-08 PROCEDURE — 99214 OFFICE O/P EST MOD 30 MIN: CPT | Performed by: INTERNAL MEDICINE

## 2017-02-08 NOTE — PROGRESS NOTES
Subjective   Keith Craig is a 61 y.o. male.     Chief Complaint   Patient presents with   • Follow-up   • STENT PLACEMENT       History of Present Illness     She was a very pleasant 61-year-old gentleman who presented to the hospital and generator 2017 with a non-ST elevation myocardial infarction.  At the time he was noted to have an occluded right coronary artery that was collateralized.  He did undergo successful revascularization of this vessel.  His peak CK was relatively low however, on his echocardiogram he did have severe hypokinesis of the basal portion of the inferior wall.  He additionally was noted to have a 70% focal lesion in the circumflex artery.  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  He states that with this he has been performing physically demanding activities.  He denies any clear angina or anginal-like symptoms.  He denies any heart failure symptoms.  He denies any palpitations, near syncope or syncopal symptoms.  He has been compliant on medical therapy.  He notes that his blood pressures have been well-controlled.    The following portions of the patient's history were reviewed and updated as appropriate: allergies, current medications, past family history, past medical history, past social history, past surgical history and problem list.    Review of Systems   Constitutional: Negative for activity change, appetite change and fatigue.   HENT: Negative for congestion and tinnitus.    Eyes: Negative for visual disturbance.   Respiratory: Negative for cough, chest tightness and shortness of breath.    Cardiovascular: Negative for chest pain, palpitations and leg swelling.   Gastrointestinal: Negative for blood in stool, nausea and vomiting.   Endocrine: Negative for cold intolerance, heat intolerance and polyuria.   Genitourinary: Negative for dysuria.   Musculoskeletal: Negative for myalgias  and neck pain.   Skin: Negative for rash.   Neurological: Negative for dizziness, syncope, weakness and light-headedness.   Hematological: Bruises/bleeds easily.   Psychiatric/Behavioral: Negative for confusion and sleep disturbance.       Objective   Physical Exam   Constitutional: He is oriented to person, place, and time. He appears well-developed and well-nourished. No distress.   HENT:   Head: Normocephalic and atraumatic.   Nose: Nose normal.   Mouth/Throat: Oropharynx is clear and moist.   Eyes: Conjunctivae and EOM are normal. Right eye exhibits no discharge. Left eye exhibits no discharge. No scleral icterus.   Neck: Normal range of motion. No hepatojugular reflux and no JVD present. Carotid bruit is not present. No tracheal deviation present.   Cardiovascular: Normal rate, regular rhythm, S1 normal, S2 normal and intact distal pulses.  PMI is not displaced.  Exam reveals no gallop, no S3, no S4 and no friction rub.    No murmur heard.  Pulmonary/Chest: Effort normal and breath sounds normal. No accessory muscle usage. No respiratory distress. He has no wheezes. He has no rales. He exhibits no tenderness.   Abdominal: Soft. Bowel sounds are normal. He exhibits no distension. There is no tenderness.   Musculoskeletal: Normal range of motion. He exhibits no edema or tenderness.       Vascular Status -  His exam exhibits no right foot edema. His exam exhibits no left foot edema.  Neurological: He is alert and oriented to person, place, and time. No cranial nerve deficit. Coordination normal.   Skin: Skin is warm and dry. He is not diaphoretic.   Nursing note and vitals reviewed.        ECG 12 Lead  Date/Time: 2/8/2017 8:55 AM  Performed by: ISAK CAT  Authorized by: ISAK CAT   Comments: EKG today reveals normal sinus rhythm with normal intervals and durations.  There are T-wave inversions in the inferior leads however, there is a normal R-wave in the same leads.  This is consistent with a  recent non-ST elevation myocardial infarction in the inferior leads but with probable viable myocardium            Assessment/Plan   CAD  In regard to his history of coronary artery disease, I did have a relatively lengthy discussion with Keith and his wife.  Specifically, I discussed the fact that he did have additional disease and a relatively important artery.  On the other hand, he is not having significant symptoms.  I do feel that there are several reasonable approaches and discussed all of them with him.  Firstly, I discussed the possibility of repeat cardiac catheterization with a fractional flow reserve measurement of the obtuse marginal branch and if this were abnormal to consider percutaneous revascularization even in the absence of symptoms based on the FAME trial.  Additionally, it would be reasonable to have him simply undergo a stress Cardiolite test and if he had an excellent exertional capacity and no evidence of objective ischemia he would be reasonable to treat him medically.  Lastly, I discussed with him the possibility of simply treating him medically based on the fact that he is not having significant symptoms and has single vessel disease.  He understood all the risks and benefits to each of these approaches and for now will continue medications as is and consider his options further.    Hyperlipidemia In regard to their history of hyperlipidemia, the most recent cholesterol panel demonstrated excellent control.  I will continue the current medications as is.    Hypertension.  The home blood pressure diary suggests reasonable control of the patient's HTN.  At this time I will continue current medications as is.  I have asked them to maintain a blood pressure diary    In short, it is been a pleasure to participate in gym's care, I look forward to seeing him in 3 months.

## 2017-02-10 PROBLEM — I10 ESSENTIAL HYPERTENSION: Status: ACTIVE | Noted: 2017-02-10

## 2017-02-10 PROBLEM — E78.2 MIXED HYPERLIPIDEMIA: Status: ACTIVE | Noted: 2017-02-10

## 2017-02-22 RX ORDER — ASPIRIN 325 MG
325 TABLET, DELAYED RELEASE (ENTERIC COATED) ORAL DAILY
Qty: 30 TABLET | Refills: 11 | Status: SHIPPED | OUTPATIENT
Start: 2017-02-22 | End: 2018-03-21 | Stop reason: SDUPTHER

## 2017-02-22 RX ORDER — CLOPIDOGREL BISULFATE 75 MG/1
75 TABLET ORAL DAILY
Qty: 30 TABLET | Refills: 11 | Status: SHIPPED | OUTPATIENT
Start: 2017-02-22 | End: 2018-03-21 | Stop reason: SDUPTHER

## 2017-02-22 RX ORDER — ATORVASTATIN CALCIUM 40 MG/1
40 TABLET, FILM COATED ORAL DAILY
Qty: 30 TABLET | Refills: 11 | Status: SHIPPED | OUTPATIENT
Start: 2017-02-22 | End: 2018-03-21 | Stop reason: SDUPTHER

## 2017-02-22 RX ORDER — CARVEDILOL 3.12 MG/1
3.12 TABLET ORAL 2 TIMES DAILY WITH MEALS
Qty: 60 TABLET | Refills: 11 | Status: SHIPPED | OUTPATIENT
Start: 2017-02-22 | End: 2018-03-05 | Stop reason: SDUPTHER

## 2017-02-23 ENCOUNTER — OFFICE VISIT (OUTPATIENT)
Dept: NEUROSURGERY | Facility: CLINIC | Age: 62
End: 2017-02-23

## 2017-02-23 VITALS
RESPIRATION RATE: 20 BRPM | HEIGHT: 71 IN | OXYGEN SATURATION: 97 % | SYSTOLIC BLOOD PRESSURE: 117 MMHG | BODY MASS INDEX: 27.72 KG/M2 | WEIGHT: 198 LBS | DIASTOLIC BLOOD PRESSURE: 76 MMHG | HEART RATE: 68 BPM | TEMPERATURE: 97.8 F

## 2017-02-23 DIAGNOSIS — M50.30 DEGENERATIVE DISC DISEASE, CERVICAL: Primary | ICD-10-CM

## 2017-02-23 PROBLEM — N20.0 CALCULUS OF KIDNEY: Status: ACTIVE | Noted: 2017-02-23

## 2017-02-23 PROBLEM — R10.9 RIGHT FLANK PAIN: Status: ACTIVE | Noted: 2017-02-23

## 2017-02-23 PROCEDURE — 99203 OFFICE O/P NEW LOW 30 MIN: CPT | Performed by: NEUROLOGICAL SURGERY

## 2017-02-23 RX ORDER — LISINOPRIL 2.5 MG/1
2.5 TABLET ORAL DAILY
Qty: 30 TABLET | Refills: 11
Start: 2017-02-23 | End: 2017-08-07 | Stop reason: ALTCHOICE

## 2017-02-23 RX ORDER — NABUMETONE 750 MG/1
750 TABLET, FILM COATED ORAL 2 TIMES DAILY
Qty: 60 TABLET | Refills: 0 | Status: SHIPPED | OUTPATIENT
Start: 2017-02-23 | End: 2018-03-21

## 2017-02-23 NOTE — PROGRESS NOTES
Keith Craig  1955  [id number]      Chief Complaint   Patient presents with   • Neck Pain   • Back Pain   • Numbness       HISTORY OF PRESENT ILLNESS:  This is a 61-year-old male who has a genetic predisposition for degenerative disc disease.  He has had issues with his lumbar spine for some time.  He was involved in an accident and subsequently developed pain in the right side of his neck.  The pain is primarily axial and nonradicular.  I am unable to elicit a history that would be consistent with nerve root or spinal cord compression.  He is have the cervical MRI and is referred for neurosurgical consultation and opinion.  The pain is interscapular to the right posterior and anterior cervical triangle.  It does not interfere with his work however.     Past Medical History   Diagnosis Date   • Heart attack    • Heart disease    • Low back pain        Past Surgical History   Procedure Laterality Date   • Pr rt/lt heart catheters N/A 1/23/2017     Procedure: Percutaneous Coronary Intervention;  Surgeon: Larry Fernandez MD;  Location:  [facility];  Service: Cardiovascular   • Cardiac catheterization N/A 1/23/2017     Procedure: Left Heart Cath;  Surgeon: Larry Fernandez MD;  Location:  [facility];  Service:    • Finger surgery         Family History   Problem Relation Age of Onset   • Heart disease Mother    • Heart disease Father        Social History     Social History   • Marital status:      Spouse name: N/A   • Number of children: N/A   • Years of education: N/A     Occupational History   • Not on file.     Social History Main Topics   • Smoking status: Never Smoker   • Smokeless tobacco: Former User     Types: Snuff     Quit date: 1/21/2006   • Alcohol use No   • Drug use: Defer   • Sexual activity: Defer     Other Topics Concern   • Not on file     Social History Narrative       No Known Allergies      Current Outpatient Prescriptions:   •  aspirin   "MG tablet, Take 1 tablet by mouth Daily., Disp: 30 tablet, Rfl: 11  •  atorvastatin (LIPITOR) 40 MG tablet, Take 1 tablet by mouth Daily., Disp: 30 tablet, Rfl: 11  •  carvedilol (COREG) 3.125 MG tablet, Take 1 tablet by mouth 2 (Two) Times a Day With Meals., Disp: 60 tablet, Rfl: 11  •  cetirizine (zyrTEC) 10 MG tablet, Take 10 mg by mouth Daily., Disp: , Rfl:   •  clopidogrel (PLAVIX) 75 MG tablet, Take 1 tablet by mouth Daily., Disp: 30 tablet, Rfl: 11  •  gabapentin (NEURONTIN) 300 MG capsule, Take 300 mg by mouth 2 (Two) Times a Day., Disp: , Rfl:   •  lisinopril (PRINIVIL,ZESTRIL) 2.5 MG tablet, Take 1 tablet by mouth Daily., Disp: 30 tablet, Rfl: 11  •  meloxicam (MOBIC) 15 MG tablet, Take 15 mg by mouth Daily As Needed., Disp: , Rfl:   •  pantoprazole (PROTONIX) 40 MG EC tablet, Take 40 mg by mouth Daily., Disp: , Rfl:     Review of Systems   Musculoskeletal: Positive for neck pain and neck stiffness.   Neurological: Positive for numbness.   All other systems reviewed and are negative.      Neurological Examination:    Vitals:    02/23/17 1634   BP: 117/76   BP Location: Right arm   Patient Position: Sitting   Cuff Size: Adult   Pulse: 68   Resp: 20   Temp: 97.8 °F (36.6 °C)   TempSrc: Oral   SpO2: 97%   Weight: 198 lb (89.8 kg)   Height: 71\" (180.3 cm)       Mental status/speech: The patient is alert and oriented.  Speech is clear without aphysia or dysarthria.  No overt cognitive deficits.    Cranial nerve examination:    Olfaction: Smell is intact.  Vision: Vision is intact without visual field abnormalities.  Funduscopic examination is normal.  No pupillary irregularity.  Ocular motor examination: The extraocular muscles are intact.  There is no diplopia.  The pupil is round and reactive to both light and accommodation.  There is no nystagmus.  Facial movement/sensation: There is no facial weakness.  Sensation is intact in the first, second, and third divisions of the trigeminal nerve.  The corneal " "reflex is intact.  Auditory: Hearing is intact to finger rub bilaterally.  Cranial nerves IX, X, XI, XII: Phonation is normal.  No dysphagia.  Tongue is protruded in the midline without atrophy.  The gag reflex is intact.  Shoulder shrug is normal.    Musculoligamentous ligamentous examination: He has fairly good range of motion of the cervical spine although does have some \"pulling\" with extreme left lateral gaze.  I'm unable to defect any weakness sensory loss or significant reflex asymmetry.  The left triceps reflex may be slightly less than that on the right, however.  His gait is normal.    Medical Decision Making:     Diagnostic Data Set:  Cervical MRI shows significant degenerative disc disease.  At C6/7 he has lateral foraminal closure secondary to a large osteophyte.  He has disc degeneration as well at C5/6.       Assessment:  Cervical spondylosis          Recommendations:  Even though he has neural foraminal stenosis a does not have any radiculopathy.  Pain is axial and nonradicular.  Surgery is not a consideration at this time.  I have given him a prescription of Relafen 750 mg twice a day to replace the Mobic 15 mg which she has taken for several months.  Another or different NSAID may be of some benefit.  I've suggested also that he become educated about the appropriate exercises and learn how to \"take care of\" his neck.  There is little else to do otherwise.  I have left my door open for him and we'll see him on as-needed basis.        I greatly appreciate the opportunity to see and evaluate this individual.  If you have questions or concerns regarding issues that I may have overlooked please call me at any time: [phone].  Damaso Oneill M.D.  [facility]  [address] [place] [address].  [place] [address]    "

## 2017-05-03 ENCOUNTER — OFFICE VISIT (OUTPATIENT)
Dept: CARDIOLOGY | Facility: CLINIC | Age: 62
End: 2017-05-03

## 2017-05-03 VITALS
HEIGHT: 71 IN | OXYGEN SATURATION: 96 % | SYSTOLIC BLOOD PRESSURE: 118 MMHG | BODY MASS INDEX: 27.86 KG/M2 | HEART RATE: 61 BPM | DIASTOLIC BLOOD PRESSURE: 73 MMHG | WEIGHT: 199 LBS

## 2017-05-03 DIAGNOSIS — I25.118 CORONARY ARTERY DISEASE OF NATIVE ARTERY OF NATIVE HEART WITH STABLE ANGINA PECTORIS (HCC): Primary | ICD-10-CM

## 2017-05-03 DIAGNOSIS — R07.89 OTHER CHEST PAIN: ICD-10-CM

## 2017-05-03 DIAGNOSIS — E78.2 MIXED HYPERLIPIDEMIA: ICD-10-CM

## 2017-05-03 DIAGNOSIS — I10 ESSENTIAL HYPERTENSION: ICD-10-CM

## 2017-05-03 PROCEDURE — 99214 OFFICE O/P EST MOD 30 MIN: CPT | Performed by: INTERNAL MEDICINE

## 2017-05-30 ENCOUNTER — HOSPITAL ENCOUNTER (OUTPATIENT)
Dept: CARDIOLOGY | Facility: HOSPITAL | Age: 62
Discharge: HOME | End: 2017-05-30
Attending: INTERNAL MEDICINE

## 2017-05-30 ENCOUNTER — HOSPITAL ENCOUNTER (OUTPATIENT)
Dept: NUCLEAR MEDICINE | Facility: HOSPITAL | Age: 62
Discharge: HOME | End: 2017-05-30
Attending: INTERNAL MEDICINE

## 2017-05-30 DIAGNOSIS — I25.118 CORONARY ARTERY DISEASE OF NATIVE ARTERY OF NATIVE HEART WITH STABLE ANGINA PECTORIS (HCC): ICD-10-CM

## 2017-05-30 DIAGNOSIS — R07.89 OTHER CHEST PAIN: ICD-10-CM

## 2017-05-30 LAB
BH CV NUCLEAR PRIOR STUDY: 3
BH CV STRESS BP STAGE 1: NORMAL
BH CV STRESS BP STAGE 2: NORMAL
BH CV STRESS BP STAGE 3: NORMAL
BH CV STRESS DURATION MIN STAGE 1: 3
BH CV STRESS DURATION MIN STAGE 2: 3
BH CV STRESS DURATION MIN STAGE 3: 3
BH CV STRESS DURATION SEC STAGE 1: 0
BH CV STRESS DURATION SEC STAGE 2: 0
BH CV STRESS DURATION SEC STAGE 3: 0
BH CV STRESS GRADE STAGE 1: 10
BH CV STRESS GRADE STAGE 2: 12
BH CV STRESS GRADE STAGE 3: 14
BH CV STRESS HR STAGE 1: 109
BH CV STRESS HR STAGE 2: 129
BH CV STRESS HR STAGE 3: 140
BH CV STRESS METS STAGE 1: 5
BH CV STRESS METS STAGE 2: 7.5
BH CV STRESS METS STAGE 3: 10
BH CV STRESS PROTOCOL 1: NORMAL
BH CV STRESS RECOVERY BP: NORMAL MMHG
BH CV STRESS RECOVERY HR: 69 BPM
BH CV STRESS SPEED STAGE 1: 1.7
BH CV STRESS SPEED STAGE 2: 2.5
BH CV STRESS SPEED STAGE 3: 3.4
BH CV STRESS STAGE 1: 1
BH CV STRESS STAGE 2: 2
BH CV STRESS STAGE 3: 3
LV EF NUC BP: 60 %
MAXIMAL PREDICTED HEART RATE: 158 BPM
PERCENT MAX PREDICTED HR: 88.61 %
STRESS BASELINE BP: NORMAL MMHG
STRESS BASELINE HR: 48 BPM
STRESS PERCENT HR: 104 %
STRESS POST ESTIMATED WORKLOAD: 10.1 METS
STRESS POST EXERCISE DUR MIN: 9 MIN
STRESS POST EXERCISE DUR SEC: 0 SEC
STRESS POST PEAK BP: NORMAL MMHG
STRESS POST PEAK HR: 140 BPM
STRESS TARGET HR: 134 BPM

## 2017-05-30 PROCEDURE — 78451 HT MUSCLE IMAGE SPECT SING: CPT

## 2017-05-30 PROCEDURE — 0 TECHNETIUM SESTAMIBI: Performed by: INTERNAL MEDICINE

## 2017-05-30 PROCEDURE — 93018 CV STRESS TEST I&R ONLY: CPT | Performed by: INTERNAL MEDICINE

## 2017-05-30 PROCEDURE — 78452 HT MUSCLE IMAGE SPECT MULT: CPT | Performed by: INTERNAL MEDICINE

## 2017-05-30 PROCEDURE — A9500 TC99M SESTAMIBI: HCPCS | Performed by: INTERNAL MEDICINE

## 2017-05-30 PROCEDURE — 93017 CV STRESS TEST TRACING ONLY: CPT

## 2017-05-30 RX ADMIN — Medication 1 DOSE: at 08:25

## 2017-05-30 RX ADMIN — Medication 1 DOSE: at 11:05

## 2017-06-06 ENCOUNTER — TELEPHONE (OUTPATIENT)
Dept: CARDIOLOGY | Facility: CLINIC | Age: 62
End: 2017-06-06

## 2017-06-06 NOTE — TELEPHONE ENCOUNTER
----- Message from Larry Fernandez MD sent at 6/5/2017  4:47 PM EDT -----  Let him know his stress test was OK    sdf    CALLED & SPOKE TO PTS WIFE BAKARI, I MADE HER AWARE THAT PER DR. FERNANDEZ HIS STRESS TEST WAS OK.    PTS WIFE BAKARI AWARE & UNDERSTANDS.    [facility]

## 2017-08-07 ENCOUNTER — OFFICE VISIT (OUTPATIENT)
Dept: CARDIOLOGY | Facility: CLINIC | Age: 62
End: 2017-08-07

## 2017-08-07 VITALS
SYSTOLIC BLOOD PRESSURE: 125 MMHG | BODY MASS INDEX: 28.11 KG/M2 | HEIGHT: 71 IN | HEART RATE: 65 BPM | DIASTOLIC BLOOD PRESSURE: 81 MMHG | WEIGHT: 200.8 LBS | OXYGEN SATURATION: 97 %

## 2017-08-07 DIAGNOSIS — I21.4 NON-STEMI (NON-ST ELEVATED MYOCARDIAL INFARCTION) (HCC): Primary | ICD-10-CM

## 2017-08-07 DIAGNOSIS — I10 ESSENTIAL HYPERTENSION: ICD-10-CM

## 2017-08-07 DIAGNOSIS — I25.118 CORONARY ARTERY DISEASE OF NATIVE ARTERY OF NATIVE HEART WITH STABLE ANGINA PECTORIS (HCC): ICD-10-CM

## 2017-08-07 DIAGNOSIS — E78.2 MIXED HYPERLIPIDEMIA: ICD-10-CM

## 2017-08-07 PROBLEM — R07.9 CHEST PAIN OF UNKNOWN ETIOLOGY: Status: RESOLVED | Noted: 2017-01-20 | Resolved: 2017-08-07

## 2017-08-07 PROCEDURE — 99214 OFFICE O/P EST MOD 30 MIN: CPT | Performed by: INTERNAL MEDICINE

## 2017-08-07 NOTE — PROGRESS NOTES
Subjective   Keith Craig is a 61 y.o. male.     Chief Complaint   Patient presents with   • Follow-up       History of Present Illness     Keith is a very pleasant 61-year-old gentleman who presented to the hospital in January 2017 with a non-ST elevation myocardial infarction. At the time he was noted to have an occluded right coronary artery that was collateralized. He did undergo successful revascularization of this vessel. His peak CK was relatively low however, on his echocardiogram he did have severe hypokinesis of the basal portion of the inferior wall. He additionally was noted to have a 60 to 70% focal lesion in the circumflex artery. As such, in May 2017 he underwent a stress Cardiolite test.  He exercised for 9 minutes on a full Edin protocol and had no chest discomfort or ischemic EKG changes.  At the reading station itself is perfusion imaging appeared to be normal.  His static images sent to PAC suggested a fixed defect in the inferior territory with some reversibility.  The lateral wall however was normally perfused.  He continues to be quite active and denies any angina or anginal-like symptoms.  He denies any congestive heart failure symptoms.  He denies any palpitations, near syncope or syncopal symptoms.  He has maintained a blood pressure diary and notes that his systolic blood pressures are in the 110-120 range.     The following portions of the patient's history were reviewed and updated as appropriate: allergies, current medications, past family history, past medical history, past social history, past surgical history and problem list.    Review of Systems   Constitutional: Negative for activity change, appetite change and fatigue.   HENT: Negative for congestion and tinnitus.    Eyes: Negative for visual disturbance.   Respiratory: Negative for cough, chest tightness and shortness of breath.    Cardiovascular: Negative for chest pain, palpitations and leg swelling.   Gastrointestinal:  Negative for blood in stool, nausea and vomiting.   Endocrine: Negative for cold intolerance, heat intolerance and polyuria.   Genitourinary: Negative for dysuria.   Musculoskeletal: Negative for myalgias and neck pain.   Skin: Negative for rash.   Neurological: Negative for dizziness, syncope, weakness and light-headedness.   Hematological: Bruises/bleeds easily.   Psychiatric/Behavioral: Negative for confusion and sleep disturbance.       Objective   Physical Exam   Constitutional: He is oriented to person, place, and time. He appears well-developed and well-nourished. No distress.   HENT:   Head: Normocephalic and atraumatic.   Nose: Nose normal.   Mouth/Throat: Oropharynx is clear and moist.   Eyes: Conjunctivae and EOM are normal. Right eye exhibits no discharge. Left eye exhibits no discharge. No scleral icterus.   Neck: Normal range of motion. No hepatojugular reflux and no JVD present. Carotid bruit is not present. No tracheal deviation present.   Cardiovascular: Normal rate, regular rhythm, S1 normal, S2 normal and intact distal pulses.  PMI is not displaced.  Exam reveals no gallop, no S3, no S4 and no friction rub.    No murmur heard.  Pulmonary/Chest: Effort normal and breath sounds normal. No accessory muscle usage. No respiratory distress. He has no wheezes. He has no rales. He exhibits no tenderness.   Abdominal: Soft. Bowel sounds are normal. He exhibits no distension. There is no tenderness.   Musculoskeletal: Normal range of motion. He exhibits no edema or tenderness.       Vascular Status -  His exam exhibits no right foot edema. His exam exhibits no left foot edema.  Neurological: He is alert and oriented to person, place, and time. No cranial nerve deficit. Coordination normal.   Skin: Skin is warm and dry. He is not diaphoretic.   Nursing note and vitals reviewed.      Procedures    Assessment/Plan     CAD In regard to his history of coronary artery disease, I did review review his cardiac  catheterization films.  Clearly I am concerned regarding his moderate lesion in his circumflex territory however, he is having absolutely no symptoms and recently underwent a full Edin protocol during which he exercised for 9 minutes.  Furthermore, perfusion imaging was unremarkable.  Given all of the above, I feel that the best course of action at this time would be to proceed with conservative management.  He does understand that should he develop any symptoms he would contact our office immediately.  For now I will continue to work on risk factor modification.     Hyperlipidemia In regard to their history of hyperlipidemia, the most recent cholesterol panel demonstrated excellent control. I will continue the current medications as is.      Hypertension. The home blood pressure diary suggests reasonable control of the patient's HTN.  Given that every assessment of his LV systolic function has been normal I've gone ahead and discontinued his lisinopril.    In short, it is been a pleasure to participate in Daniel's care, I look forward to seeing him again in 6 months.

## 2018-03-05 RX ORDER — CARVEDILOL 3.12 MG/1
TABLET ORAL
Qty: 60 TABLET | Refills: 0 | Status: SHIPPED | OUTPATIENT
Start: 2018-03-05 | End: 2018-03-21 | Stop reason: SDUPTHER

## 2018-03-21 ENCOUNTER — OFFICE VISIT (OUTPATIENT)
Dept: CARDIOLOGY | Facility: CLINIC | Age: 63
End: 2018-03-21

## 2018-03-21 VITALS
HEART RATE: 61 BPM | HEIGHT: 71 IN | DIASTOLIC BLOOD PRESSURE: 73 MMHG | SYSTOLIC BLOOD PRESSURE: 113 MMHG | RESPIRATION RATE: 16 BRPM | WEIGHT: 201 LBS | BODY MASS INDEX: 28.14 KG/M2

## 2018-03-21 DIAGNOSIS — I25.10 ASCVD (ARTERIOSCLEROTIC CARDIOVASCULAR DISEASE): Primary | ICD-10-CM

## 2018-03-21 DIAGNOSIS — E78.2 MIXED HYPERLIPIDEMIA: ICD-10-CM

## 2018-03-21 DIAGNOSIS — I10 ESSENTIAL HYPERTENSION: ICD-10-CM

## 2018-03-21 DIAGNOSIS — I21.4 NON-STEMI (NON-ST ELEVATED MYOCARDIAL INFARCTION) (HCC): ICD-10-CM

## 2018-03-21 PROCEDURE — 93000 ELECTROCARDIOGRAM COMPLETE: CPT | Performed by: PHYSICIAN ASSISTANT

## 2018-03-21 PROCEDURE — 99213 OFFICE O/P EST LOW 20 MIN: CPT | Performed by: PHYSICIAN ASSISTANT

## 2018-03-21 RX ORDER — ATORVASTATIN CALCIUM 40 MG/1
40 TABLET, FILM COATED ORAL DAILY
Qty: 90 TABLET | Refills: 1 | Status: SHIPPED | OUTPATIENT
Start: 2018-03-21

## 2018-03-21 RX ORDER — CARVEDILOL 3.12 MG/1
3.12 TABLET ORAL 2 TIMES DAILY WITH MEALS
Qty: 180 TABLET | Refills: 1 | Status: SHIPPED | OUTPATIENT
Start: 2018-03-21

## 2018-03-21 RX ORDER — CLOPIDOGREL BISULFATE 75 MG/1
75 TABLET ORAL DAILY
Qty: 90 TABLET | Refills: 1 | Status: SHIPPED | OUTPATIENT
Start: 2018-03-21 | End: 2018-12-04 | Stop reason: SDUPTHER

## 2018-03-21 RX ORDER — ASPIRIN 81 MG/1
81 TABLET ORAL DAILY
Qty: 30 TABLET | Refills: 0
Start: 2018-03-21

## 2018-03-21 NOTE — PROGRESS NOTES
Lakshmi Anton  Keith Craig  1955 03/21/2018    Patient Active Problem List   Diagnosis   • Non-STEMI (non-ST elevated myocardial infarction)   • Essential hypertension   • Mixed hyperlipidemia   • Calculus of kidney   • Right flank pain   • Coronary artery disease of native artery of native heart with stable angina pectoris     Dear Lakshmi Anton:    Chief Complaint   Patient presents with   • Non-Stemi myocardial infarction   • Coronary Artery Disease   • Other     pt brought RX bottles, doing well   • Establish Care     Subjective     Keith Craig is a 62 y.o. male with a past medical history significant for atherosclerotic cardiovascular disease with previous NSTEMI in January 2017 with subsequent coronary angiography revealing an occluded right coronary artery that was collateralized. He did have PTCA and stenting of this lesion. He did undergo successful revascularization of that vessel.  He also had a 60-70% focal lesion in the circumflex coronary artery.  He subsequently had a stress test which was negative for evidence of myocardial ischemia.  He was also asymptomatic, therefore medical therapy was recommended for the circumflex lesion.  He was previously following with interventional cardiologist, Dr. Larry Fernandez, however he has left the area and he is here to establish care and follow-up.    He states he has been doing well without any complaints of chest pains, shortness of breath, palpitations or pedal edema.  He denies any bleeding issues.  He takes his medications regularly and follows up with PCP on a routine basis.  He had labs with PCP recently.      Current Outpatient Prescriptions:   •  aspirin EC 81 MG EC tablet, Take 1 tablet by mouth Daily., Disp: 30 tablet, Rfl: 0  •  atorvastatin (LIPITOR) 40 MG tablet, Take 1 tablet by mouth Daily., Disp: 90 tablet, Rfl: 1  •  carvedilol (COREG) 3.125 MG tablet, Take 1 tablet by mouth 2 (Two) Times a Day With Meals., Disp: 180  "tablet, Rfl: 1  •  cetirizine (zyrTEC) 10 MG tablet, Take 10 mg by mouth Daily., Disp: , Rfl:   •  clopidogrel (PLAVIX) 75 MG tablet, Take 1 tablet by mouth Daily., Disp: 90 tablet, Rfl: 1  •  gabapentin (NEURONTIN) 300 MG capsule, Take 300 mg by mouth Daily., Disp: , Rfl:   •  pantoprazole (PROTONIX) 40 MG EC tablet, Take 40 mg by mouth Daily., Disp: , Rfl:     The following portions of the patient's history were reviewed and updated as appropriate: allergies, current medications, past family history, past medical history, past social history, past surgical history and problem list.    Social History     Social History   • Marital status:      Spouse name: N/A   • Number of children: N/A   • Years of education: N/A     Occupational History   • Not on file.     Social History Main Topics   • Smoking status: Never Smoker   • Smokeless tobacco: Former User     Types: Snuff     Quit date: 1/21/2006   • Alcohol use No   • Drug use: No   • Sexual activity: Defer     Other Topics Concern   • Not on file     Social History Narrative   • No narrative on file     ROS  Constitution: Negative for malaise/fatigue.   HENT: Positive for hearing loss (rt side ).    Eyes: Negative for blurred vision.        Wears contacts     Cardiovascular: Negative for chest pain, dyspnea on exertion, irregular heartbeat, leg swelling, orthopnea, palpitations and paroxysmal nocturnal dyspnea.   Respiratory: Negative for shortness of breath.    Hematologic/Lymphatic: Bruises/bleeds easily.   Skin: Negative for rash.   Gastrointestinal: Negative for abdominal pain, change in bowel habit, nausea and vomiting.   Neurological: Negative for dizziness, headaches and light-headedness.   Psychiatric/Behavioral: Negative for depression. The patient does not have insomnia and is not nervous/anxious.      Objective   Blood pressure 113/73, pulse 61, resp. rate 16, height 180.3 cm (71\"), weight 91.2 kg (201 lb).  Body mass index is 28.03 " kg/m².    Physical Exam   Constitutional: He is oriented to person, place, and time. He appears well-developed and well-nourished. No distress.   HENT:   Head: Normocephalic and atraumatic.   Eyes: Conjunctivae are normal. Right eye exhibits no discharge. Left eye exhibits no discharge.   Neck: Normal range of motion. Neck supple. Carotid bruit is not present.   Cardiovascular: Normal rate, regular rhythm and normal heart sounds.  Exam reveals no gallop and no friction rub.    No murmur heard.  Pulmonary/Chest: Effort normal and breath sounds normal. No respiratory distress. He has no wheezes. He has no rales. He exhibits no tenderness.   Musculoskeletal: Normal range of motion. He exhibits no edema.   Neurological: He is alert and oriented to person, place, and time.   Skin: Skin is warm and dry. No rash noted. He is not diaphoretic. No erythema. No pallor.   Psychiatric: He has a normal mood and affect. His behavior is normal.   Nursing note and vitals reviewed.      ECG 12 Lead  Date/Time: 3/21/2018 1:06 PM  Performed by: SOPHIE LOPEZ  Authorized by: SOPHIE LOPEZ   Comparison: compared with previous ECG   Rhythm: sinus rhythm  Rate: normal  BPM: 61  T flattening: III  QRS axis: normal  Clinical impression: non-specific ECG  Comments: Sinus rhythm at a rate of 61 bpm with improvement noted in previously noted inferior ST/T-wave changes.  Now only has nonspecific T-wave flattening in lead 3.  QTC 396ms.        Left heart catheterization 1/23/17  · Right dominant coronary artery system with an acute occlusion of the right coronary artery  · Successful angioplasty and stenting of the right coronary artery.     Final impression:  Right dominant coronary artery system with single vessel disease involving acute occlusion of the right coronary artery.  Successful angioplasty and stenting of the right coronary artery     Procedures performed:  Coronary angiography  Angioplasty and stenting of the  right coronary artery    Findings in detail:  Left Main coronary artery:  The left main coronary artery is a large vessel which bifurcates into the LAD and circumflex arteries.  The left main coronary artery is free of atherosclerotic disease.     Left anterior descending artery:  The left anterior descending artery is a large vessel which reaches beyond the apex the ventricle and gives rise to 2 diagonal branches the first of which is small the next of which is small to moderate.  There is a previously implanted stent noted in the proximal portion of the LAD this is patent with at most 30% in-stent restenosis.     Circumflex artery:  The circumflex artery is a large vessel which gives rise to 2 obtuse marginal branches the first of which is moderate the next of which is large.  There is a 50-60% lesion noted in the proximal portion of the second obtuse marginal branch.     Right coronary artery:  The right coronary artery is a large vessel which gives rise to the posterior descending artery and multiple posterior lateral branches.  Prior to intervention there is a complete 100% occlusion of the midportion of the right coronary artery.  This was the culprit lesion.  There is PHIL 0 flow the distal vascular bed.  Postintervention this is reduced to 0% residual stenosis with PHIL-3 flow in the distal vascular bed.  The vessel diameter is 3.5 mm.  The lesion length is approximately 25 mm.  This is a type B or high risk lesion due to presence of visible thrombus in lesion length.     Final impression and plan:  Overall it is my impression that Heriberto has undergone successful percutaneous revascularization of the right coronary artery.  It would be reasonable to consider follow-up stress testing in approximately one month to further assess the lesion in the circumflex artery.    Nuclear stress test 5/30/17  Interpretation Summary     · Findings consistent with a normal ECG stress test.  · Myocardial perfusion imaging  indicates a normal myocardial perfusion study with no evidence of ischemia.  · Left ventricular ejection fraction is normal (Calculated EF = 60%).  · Impressions are consistent with a low risk study.  · Based on the patient's exercise capacity he has a good cardiovascular prognosis     Assessment:          Diagnosis Plan   1. ASCVD (arteriosclerotic cardiovascular disease)      s/p non-STEMI in January 2017 with PTCA and stenting of a 100% occluded RCA.  Remaining 60-70% left circumflex stenosis treated medically.   2. Non-STEMI (non-ST elevated myocardial infarction)     3. Essential hypertension     4. Mixed hyperlipidemia          Plan:       1. Discussed the patient's BMI with him. BMI is above normal parameters. Follow-up plan includes:  educational material.  2. Continue aspirin, Plavix, atorvastatin, and carvedilol.  3. Since he is tolerating the Plavix and had a remaining 60-70% left circumflex stenosis after his last cardiac catheter, we will continue it for now.  He was asked to monitor for any bleeding and let us know should he develop these.  4. I would decrease his aspirin to 81 mg enteric-coated tablet daily.  5. We will get most recent labs from PCP which she had recently to ensure cholesterol is at goal.  6. We will follow-up in 6 months or sooner if needed.    No Follow-up on file.    I appreciate the opportunity to participate in this patient's cardiovascular care.    Best Regards,    Umu Bartlett PA-C

## 2018-03-21 NOTE — PATIENT INSTRUCTIONS
BMI for Adults  Body mass index (BMI) is a number that is calculated from a person's weight and height. In most adults, the number is used to find how much of an adult's weight is made up of fat. BMI is not as accurate as a direct measure of body fat.  HOW IS BMI CALCULATED?  BMI is calculated by dividing weight in kilograms by height in meters squared. It can also be calculated by dividing weight in pounds by height in inches squared, then multiplying the resulting number by 703. Charts are available to help you find your BMI quickly and easily without doing this calculation.   HOW IS BMI INTERPRETED?  Health care professionals use BMI charts to identify whether an adult is underweight, at a normal weight, or overweight based on the following guidelines:  · Underweight: BMI less than 18.5.  · Normal weight: BMI between 18.5 and 24.9.  · Overweight: BMI between 25 and 29.9.  · Obese: BMI of 30 and above.  BMI is usually interpreted the same for males and females.  Weight includes both fat and muscle, so someone with a muscular build, such as an athlete, may have a BMI that is higher than 24.9. In cases like these, BMI may not accurately depict body fat. To determine if excess body fat is the cause of a BMI of 25 or higher, further assessments may need to be done by a health care provider.  WHY IS BMI A USEFUL TOOL?  BMI is used to identify a possible weight problem that may be related to a medical problem or may increase the risk for medical problems. BMI can also be used to promote changes to reach a healthy weight.     This information is not intended to replace advice given to you by your health care provider. Make sure you discuss any questions you have with your health care provider.     Document Released: 08/29/2005 Document Revised: 01/08/2016 Document Reviewed: 05/15/2015  [employer] Interactive Patient Education ©2017 [employer]       Calorie Counting for Weight Loss  Calories are energy you get from the  things you eat and drink. Your body uses this energy to keep you going throughout the day. The number of calories you eat affects your weight. When you eat more calories than your body needs, your body stores the extra calories as fat. When you eat fewer calories than your body needs, your body burns fat to get the energy it needs.  Calorie counting means keeping track of how many calories you eat and drink each day. If you make sure to eat fewer calories than your body needs, you should lose weight. In order for calorie counting to work, you will need to eat the number of calories that are right for you in a day to lose a healthy amount of weight per week. A healthy amount of weight to lose per week is usually 1-2 lb (0.5-0.9 kg). A dietitian can determine how many calories you need in a day and give you suggestions on how to reach your calorie goal.   WHAT IS MY MY PLAN?  My goal is to have __________ calories per day.   If I have this many calories per day, I should lose around __________ pounds per week.  WHAT DO I NEED TO KNOW ABOUT CALORIE COUNTING?  In order to meet your daily calorie goal, you will need to:  · Find out how many calories are in each food you would like to eat. Try to do this before you eat.  · Decide how much of the food you can eat.  · Write down what you ate and how many calories it had. Doing this is called keeping a food log.  WHERE DO I FIND CALORIE INFORMATION?  The number of calories in a food can be found on a Nutrition Facts label. Note that all the information on a label is based on a specific serving of the food. If a food does not have a Nutrition Facts label, try to look up the calories online or ask your dietitian for help.  HOW DO I DECIDE HOW MUCH TO EAT?  To decide how much of the food you can eat, you will need to consider both the number of calories in one serving and the size of one serving. This information can be found on the Nutrition Facts label. If a food does not  have a Nutrition Facts label, look up the information online or ask your dietitian for help.  Remember that calories are listed per serving. If you choose to have more than one serving of a food, you will have to multiply the calories per serving by the amount of servings you plan to eat. For example, the label on a package of bread might say that a serving size is 1 slice and that there are 90 calories in a serving. If you eat 1 slice, you will have eaten 90 calories. If you eat 2 slices, you will have eaten 180 calories.  HOW DO I KEEP A FOOD LOG?  After each meal, record the following information in your food log:  · What you ate.  · How much of it you ate.  · How many calories it had.  · Then, add up your calories.  Keep your food log near you, such as in a small notebook in your pocket. Another option is to use a mobile sherly or website. Some programs will calculate calories for you and show you how many calories you have left each time you add an item to the log.  WHAT ARE SOME CALORIE COUNTING TIPS?  · Use your calories on foods and drinks that will fill you up and not leave you hungry. Some examples of this include foods like nuts and nut butters, vegetables, lean proteins, and high-fiber foods (more than 5 g fiber per serving).  · Eat nutritious foods and avoid empty calories. Empty calories are calories you get from foods or beverages that do not have many nutrients, such as candy and soda. It is better to have a nutritious high-calorie food (such as an avocado) than a food with few nutrients (such as a bag of chips).  · Know how many calories are in the foods you eat most often. This way, you do not have to look up how many calories they have each time you eat them.  · Look out for foods that may seem like low-calorie foods but are really high-calorie foods, such as baked goods, soda, and fat-free candy.  · Pay attention to calories in drinks. Drinks such as sodas, specialty coffee drinks, alcohol, and  juices have a lot of calories yet do not fill you up. Choose low-calorie drinks like water and diet drinks.  · Focus your calorie counting efforts on higher calorie items. Logging the calories in a garden salad that contains only vegetables is less important than calculating the calories in a milk shake.  · Find a way of tracking calories that works for you. Get creative. Most people who are successful find ways to keep track of how much they eat in a day, even if they do not count every calorie.  WHAT ARE SOME PORTION CONTROL TIPS?  · Know how many calories are in a serving. This will help you know how many servings of a certain food you can have.  · Use a measuring cup to measure serving sizes. This is helpful when you start out. With time, you will be able to estimate serving sizes for some foods.  · Take some time to put servings of different foods on your favorite plates, bowls, and cups so you know what a serving looks like.  · Try not to eat straight from a bag or box. Doing this can lead to overeating. Put the amount you would like to eat in a cup or on a plate to make sure you are eating the right portion.  · Use smaller plates, glasses, and bowls to prevent overeating. This is a quick and easy way to practice portion control. If your plate is smaller, less food can fit on it.  · Try not to multitask while eating, such as watching TV or using your computer. If it is time to eat, sit down at a table and enjoy your food. Doing this will help you to start recognizing when you are full. It will also make you more aware of what and how much you are eating.  HOW CAN I CALORIE COUNT WHEN EATING OUT?  · Ask for smaller portion sizes or child-sized portions.  · Consider sharing an entree and sides instead of getting your own entree.  · If you get your own entree, eat only half. Ask for a box at the beginning of your meal and put the rest of your entree in it so you are not tempted to eat it.  · Look for the calories  "on the menu. If calories are listed, choose the lower calorie options.  · Choose dishes that include vegetables, fruits, whole grains, low-fat dairy products, and lean protein. Focusing on smart food choices from each of the 5 food groups can help you stay on track at restaurants.  · Choose items that are boiled, broiled, grilled, or steamed.  · Choose water, milk, unsweetened iced tea, or other drinks without added sugars. If you want an alcoholic beverage, choose a lower calorie option. For example, a regular sussy can have up to 700 calories and a glass of wine has around 150.  · Stay away from items that are buttered, battered, fried, or served with cream sauce. Items labeled \"crispy\" are usually fried, unless stated otherwise.  · Ask for dressings, sauces, and syrups on the side. These are usually very high in calories, so do not eat much of them.  · Watch out for salads. Many people think salads are a healthy option, but this is often not the case. Many salads come with dennison, fried chicken, lots of cheese, fried chips, and dressing. All of these items have a lot of calories. If you want a salad, choose a garden salad and ask for grilled meats or steak. Ask for the dressing on the side, or ask for olive oil and vinegar or lemon to use as dressing.  · Estimate how many servings of a food you are given. For example, a serving of cooked rice is ½ cup or about the size of half a tennis ball or one cupcake wrapper. Knowing serving sizes will help you be aware of how much food you are eating at restaurants. The list below tells you how big or small some common portion sizes are based on everyday objects.  ¨ 1 oz--4 stacked dice.  ¨ 3 oz--1 deck of cards.  ¨ 1 tsp--1 dice.  ¨ 1 Tbsp--½ a Ping-Pong ball.  ¨ 2 Tbsp--1 Ping-Pong ball.  ¨ ½ cup--1 tennis ball or 1 cupcake wrapper.  ¨ 1 cup--1 baseball.     This information is not intended to replace advice given to you by your health care provider. Make sure you " discuss any questions you have with your health care provider.     Document Released: 12/18/2006 Document Revised: 01/08/2016 Document Reviewed: 10/23/2014  [employer] Interactive Patient Education ©2017 [employer]

## 2018-03-21 NOTE — PROGRESS NOTES
Daryn Helton, APRN  Keith Craig  1955 03/21/2018    Patient Active Problem List   Diagnosis   • Non-STEMI (non-ST elevated myocardial infarction)   • Essential hypertension   • Mixed hyperlipidemia   • Calculus of kidney   • Right flank pain   • Coronary artery disease of native artery of native heart with stable angina pectoris       Dear Daryn Helton, APRN:    Geovany Craig is a 62 y.o. male with the problems as listed above, presents      History of Present Illness:        No Known Allergies:      Current Outpatient Prescriptions:   •  aspirin  MG tablet, Take 1 tablet by mouth Daily., Disp: 30 tablet, Rfl: 11  •  atorvastatin (LIPITOR) 40 MG tablet, Take 1 tablet by mouth Daily., Disp: 30 tablet, Rfl: 11  •  carvedilol (COREG) 3.125 MG tablet, TAKE 1 TABLET BY MOUTH TWICE A DAY WITH FOOD FOR HEART RATE & BLOODPRESSURE, Disp: 60 tablet, Rfl: 0  •  cetirizine (zyrTEC) 10 MG tablet, Take 10 mg by mouth Daily., Disp: , Rfl:   •  clopidogrel (PLAVIX) 75 MG tablet, Take 1 tablet by mouth Daily., Disp: 30 tablet, Rfl: 11  •  gabapentin (NEURONTIN) 300 MG capsule, Take 300 mg by mouth Daily., Disp: , Rfl:   •  pantoprazole (PROTONIX) 40 MG EC tablet, Take 40 mg by mouth Daily., Disp: , Rfl:   •  meloxicam (MOBIC) 15 MG tablet, Take 15 mg by mouth Daily As Needed., Disp: , Rfl:   •  nabumetone (RELAFEN) 750 MG tablet, Take 1 tablet by mouth 2 (Two) Times a Day., Disp: 60 tablet, Rfl: 0      The following portions of the patient's history were reviewed and updated as appropriate: allergies, current medications, past family history, past medical history, past social history, past surgical history and problem list.    Social History   Substance Use Topics   • Smoking status: Never Smoker   • Smokeless tobacco: Former User     Types: Snuff     Quit date: 1/21/2006   • Alcohol use No       Review of Systems   Constitution: Negative for malaise/fatigue.   HENT: Positive for hearing loss  "(rt side ).    Eyes: Negative for blurred vision.        Wears contacts     Cardiovascular: Negative for chest pain, dyspnea on exertion, irregular heartbeat, leg swelling, orthopnea, palpitations and paroxysmal nocturnal dyspnea.   Respiratory: Negative for shortness of breath.    Hematologic/Lymphatic: Bruises/bleeds easily.   Skin: Negative for rash.   Gastrointestinal: Negative for abdominal pain, change in bowel habit, nausea and vomiting.   Neurological: Negative for dizziness, headaches and light-headedness.   Psychiatric/Behavioral: Negative for depression. The patient does not have insomnia and is not nervous/anxious.        Objective   Vitals:    03/21/18 1215   BP: 113/73   BP Location: Right arm   Patient Position: Sitting   Cuff Size: Adult   Pulse: 61   Resp: 16   Weight: 91.2 kg (201 lb)   Height: 180.3 cm (71\")     Body mass index is 28.03 kg/m².        Physical Exam    Lab Results   Component Value Date     01/24/2017    K 4.5 01/24/2017     01/24/2017    CO2 24.7 01/24/2017    BUN 13 01/24/2017    CREATININE 0.93 01/24/2017    GLUCOSE 102 01/24/2017    CALCIUM 8.6 01/24/2017    AST 61 (H) 01/20/2017    ALT 27 01/20/2017    ALKPHOS 64 01/20/2017    LABIL2 1.2 (L) 01/20/2017     Lab Results   Component Value Date    CKTOTAL 545 (C) 01/21/2017     Lab Results   Component Value Date    WBC 6.50 01/24/2017    HGB 13.2 (L) 01/24/2017    HCT 41.4 (L) 01/24/2017     01/24/2017     Lab Results   Component Value Date    INR 0.94 01/20/2017    INR 0.93 01/20/2017     No results found for: MG  Lab Results   Component Value Date    TRIG 202 (H) 01/20/2017    HDL 35 (L) 01/20/2017     (H) 01/20/2017      Lab Results   Component Value Date    .0 (H) 01/20/2017     Echo   Lab Results   Component Value Date    ECHOEFEST 60 01/21/2017       ECG 12 Lead  Date/Time: 3/21/2018 12:22 PM  Performed by: ROXANA MAYO  Authorized by: ROXANA MAYO               Assessment/Plan   No " diagnosis found.             Recommendations:       No Follow-up on file.    As always, I appreciate very much the opportunity to participate in the cardiovascular care of your patients.      With Best Regards,    Qeu Thornton MD, FACC    Dragon disclaimer:  Much of this encounter note is an electronic transcription/translation of spoken language to printed text. The electronic translation of spoken language may permit erroneous, or at times, nonsensical words or phrases to be inadvertently transcribed; Although I have reviewed the note for such errors, some may still exist.

## 2018-10-08 ENCOUNTER — TELEPHONE (OUTPATIENT)
Dept: CARDIOLOGY | Facility: CLINIC | Age: 63
End: 2018-10-08

## 2018-10-08 ENCOUNTER — OFFICE VISIT (OUTPATIENT)
Dept: CARDIOLOGY | Facility: CLINIC | Age: 63
End: 2018-10-08

## 2018-10-08 VITALS
DIASTOLIC BLOOD PRESSURE: 75 MMHG | BODY MASS INDEX: 27.72 KG/M2 | HEIGHT: 71 IN | WEIGHT: 198 LBS | RESPIRATION RATE: 16 BRPM | SYSTOLIC BLOOD PRESSURE: 112 MMHG | HEART RATE: 60 BPM

## 2018-10-08 DIAGNOSIS — I25.118 CORONARY ARTERY DISEASE OF NATIVE ARTERY OF NATIVE HEART WITH STABLE ANGINA PECTORIS (HCC): ICD-10-CM

## 2018-10-08 DIAGNOSIS — I10 ESSENTIAL HYPERTENSION: ICD-10-CM

## 2018-10-08 DIAGNOSIS — E78.2 MIXED HYPERLIPIDEMIA: ICD-10-CM

## 2018-10-08 DIAGNOSIS — I21.4 NON-STEMI (NON-ST ELEVATED MYOCARDIAL INFARCTION) (HCC): Primary | ICD-10-CM

## 2018-10-08 PROCEDURE — 93000 ELECTROCARDIOGRAM COMPLETE: CPT | Performed by: NURSE PRACTITIONER

## 2018-10-08 PROCEDURE — 99213 OFFICE O/P EST LOW 20 MIN: CPT | Performed by: NURSE PRACTITIONER

## 2018-10-08 NOTE — PROGRESS NOTES
Lakshmi Anton  Keith Craig  1955  10/08/2018    Patient Active Problem List   Diagnosis   • Non-STEMI (non-ST elevated myocardial infarction) (CMS/[facility])   • Essential hypertension   • Mixed hyperlipidemia   • Calculus of kidney   • Right flank pain   • Coronary artery disease of native artery of native heart with stable angina pectoris (CMS/[facility])       Dear Lakshmi Anton:    Subjective     Chief Complaint   Patient presents with   • Follow-up     6 mos   • Med Management     list provided           History of Present Illness:    Keith Craig is a 62 y.o. male with a history of ASCVD with previous non-STEMI in January 2017 with PTCA and stenting of an occluded right coronary artery.  He also had a 60-70% lesion of the circumflex coronary artery.  He underwent subsequent stress testing which was negative for evidence of myocardial ischemia with great exercise tolerance.  Today, he reports he has been doing well.  He denies chest pain, shortness of breath, palpitations, and pedal edema.  He denies dizziness, lightheadedness, and near syncope.  He has developed a tremor in his right arm for which he is following with his primary care.          No Known Allergies:      Current Outpatient Prescriptions:   •  aspirin EC 81 MG EC tablet, Take 1 tablet by mouth Daily., Disp: 30 tablet, Rfl: 0  •  atorvastatin (LIPITOR) 40 MG tablet, Take 1 tablet by mouth Daily., Disp: 90 tablet, Rfl: 1  •  carvedilol (COREG) 3.125 MG tablet, Take 1 tablet by mouth 2 (Two) Times a Day With Meals., Disp: 180 tablet, Rfl: 1  •  cetirizine (zyrTEC) 10 MG tablet, Take 10 mg by mouth Daily., Disp: , Rfl:   •  clopidogrel (PLAVIX) 75 MG tablet, Take 1 tablet by mouth Daily., Disp: 90 tablet, Rfl: 1  •  pantoprazole (PROTONIX) 40 MG EC tablet, Take 40 mg by mouth Daily., Disp: , Rfl:   •  gabapentin (NEURONTIN) 300 MG capsule, Take 300 mg by mouth 2 (Two) Times a Day., Disp: , Rfl:       The following portions of the patient's  "history were reviewed and updated as appropriate: allergies, current medications, past family history, past medical history, past social history, past surgical history and problem list.    Social History   Substance Use Topics   • Smoking status: Never Smoker   • Smokeless tobacco: Former User     Types: Snuff     Quit date: 1/21/2006   • Alcohol use No       Review of Systems   Cardiovascular: Negative for chest pain, leg swelling and palpitations.   Respiratory: Negative for shortness of breath.        Objective   Vitals:    10/08/18 0858   BP: 112/75   Pulse: 60   Resp: 16   Weight: 89.8 kg (198 lb)   Height: 180.3 cm (71\")     Body mass index is 27.62 kg/m².        Physical Exam   Constitutional: He is oriented to person, place, and time. He appears well-developed and well-nourished.   HENT:   Head: Normocephalic and atraumatic.   Cardiovascular: Regular rhythm and normal heart sounds.  Bradycardia present.  Exam reveals no S3 and no S4.    No murmur heard.  Pulmonary/Chest: Effort normal and breath sounds normal. He has no wheezes. He has no rales.   Abdominal: Soft. Bowel sounds are normal.   Musculoskeletal: He exhibits no edema.   Neurological: He is alert and oriented to person, place, and time.   Skin: Skin is warm and dry.   Psychiatric: He has a normal mood and affect. His behavior is normal.               ECG 12 Lead  Date/Time: 10/8/2018 9:01 AM  Performed by: CHARITY OLSON  Authorized by: CHARITY OLSON   Comparison: compared with previous ECG   Similar to previous ECG  Rhythm: sinus rhythm  BPM: 51  T flattening: aVL and V1                Assessment/Plan    Diagnosis Plan   1. Non-STEMI (non-ST elevated myocardial infarction) (CMS/HCC)     2. Essential hypertension     3. Mixed hyperlipidemia     4. Coronary artery disease of native artery of native heart with stable angina pectoris (CMS/HCC)                  Recommendations:    1. Continue low dose aspirin, Plavix, atorvastatin, and " carvedilol    2. Will request recent lipid panel from PCP    3. If he develops any symptoms in the future, would need to repeat stress testing. At this point will continue to monitor. He voices understanding.    4. Follow up in 6 months and PRN             Return in about 6 months (around 4/8/2019) for Recheck.    As always, I appreciate very much the opportunity to participate in the cardiovascular care of your patients.      With Best Regards,    KISHAN Be

## 2018-10-08 NOTE — TELEPHONE ENCOUNTER
----- Message from KISHAN Be sent at 10/8/2018  9:14 AM EDT -----  Please get recent lipid panel from PCP (done in July)

## 2018-10-08 NOTE — TELEPHONE ENCOUNTER
----- Message from KISHAN Be sent at 10/8/2018  2:34 PM EDT -----  I would like him to repeat CMP and lipid panel fasting. If LDL is above 70, will need to increase atorvastatin.

## 2018-10-09 DIAGNOSIS — Z79.899 DRUG THERAPY: Primary | ICD-10-CM

## 2018-10-09 DIAGNOSIS — E78.5 DYSLIPIDEMIA: ICD-10-CM

## 2018-10-09 NOTE — TELEPHONE ENCOUNTER
Li advised pt and his wife today, and lab orders were placed:    Notes recorded by Li Braxton MA on 10/9/2018 at 9:55 AM EDT  Called pt and spoke with his wife ( on [employer]) I advised her and she expressed understanding and stated he will go to his PCP to have his labs done. I will fax them the order.  ------

## 2018-12-04 RX ORDER — CLOPIDOGREL BISULFATE 75 MG/1
75 TABLET ORAL DAILY
Qty: 90 TABLET | Refills: 0 | Status: SHIPPED | OUTPATIENT
Start: 2018-12-04

## 2021-02-22 DIAGNOSIS — Z23 IMMUNIZATION DUE: ICD-10-CM

## (undated) DEVICE — PK CATH CARD 70

## (undated) DEVICE — XIENCE ALPINE EVEROLIMUS ELUTING CORONARY STENT SYSTEM 3.00 MM X 18 MM / RAPID-EXCHANGE
Type: IMPLANTABLE DEVICE | Status: NON-FUNCTIONAL
Brand: XIENCE ALPINE

## (undated) DEVICE — ST INF PRI SMRTSTE 20DRP 2VLV 24ML 117

## (undated) DEVICE — Device: Brand: MEDEX

## (undated) DEVICE — DRSNG SURESITE WNDW 4X4.5

## (undated) DEVICE — HI-TORQUE BALANCE MIDDLEWEIGHT GUIDE WIRE W/HYDROCOAT .014 STRAIGHT TIP 3.0 CM X 190 CM: Brand: HI-TORQUE BALANCE MIDDLEWEIGHT

## (undated) DEVICE — ADULT DISPOSABLE SINGLE-PATIENT USE PULSE OXIMETER SENSOR: Brand: NONIN

## (undated) DEVICE — CATH F5 INF JR 4 100CM: Brand: INFINITI

## (undated) DEVICE — CANNULA,OXY,ADULT,SUPER SOFT,W/14'TUB,UC: Brand: MEDLINE INDUSTRIES, INC.

## (undated) DEVICE — SHEATH INTRO SUPERSHEATH JWIRE .035 5F 11CM

## (undated) DEVICE — THE PRONTO LP CATHETER IS INDICATED FOR THE REMOVAL OF FRESH, SOFT EMBOLI AND THROMBI FROM VESSELS IN THE CORONARY AND PERIPHERAL SYSTEM.: Brand: PRONTO® LP EXTRACTION CATHETER

## (undated) DEVICE — CATH F5 INF JL 4 100CM: Brand: INFINITI

## (undated) DEVICE — NC TREK CORONARY DILATATION CATHETER 3.5 MM X 20 MM / RAPID-EXCHANGE: Brand: NC TREK

## (undated) DEVICE — GW INQWIRE FC PTFE J/3MM .035 180

## (undated) DEVICE — ST EXT IV SMARTSITE 2VLV SP M LL 5ML IV1

## (undated) DEVICE — BG PRESS INFSR 500CC

## (undated) DEVICE — LN INJ CONTRST FLXCIL HP F/M LL 1200PSI10

## (undated) DEVICE — DEV INFL MONARCH 25W

## (undated) DEVICE — MINI TREK CORONARY DILATATION CATHETER 1.50 MM X 15 MM / RAPID-EXCHANGE: Brand: MINI TREK

## (undated) DEVICE — KT NOVA WTRANSD 60 IN

## (undated) DEVICE — Device

## (undated) DEVICE — 6F .070 JR 4 100CM: Brand: CORDIS

## (undated) DEVICE — MINI TREK CORONARY DILATATION CATHETER 2.0 MM X 20 MM / RAPID-EXCHANGE: Brand: MINI TREK

## (undated) DEVICE — SHEATH INTRO SUPERSHEATH JWIRE .035 6F 11CM

## (undated) DEVICE — HI-TORQUE CROSS-IT 100XT GUIDE WIRE W/HYDROCOAT HYDROPHILIC COATING .014 STRAIGHT TIP  3.0 CM X 190 CM: Brand: CROSS-IT